# Patient Record
Sex: FEMALE | Race: OTHER | NOT HISPANIC OR LATINO | Employment: UNEMPLOYED | URBAN - METROPOLITAN AREA
[De-identification: names, ages, dates, MRNs, and addresses within clinical notes are randomized per-mention and may not be internally consistent; named-entity substitution may affect disease eponyms.]

---

## 2022-05-02 ENCOUNTER — OFFICE VISIT (OUTPATIENT)
Dept: FAMILY MEDICINE | Facility: CLINIC | Age: 27
End: 2022-05-02
Payer: COMMERCIAL

## 2022-05-02 VITALS
BODY MASS INDEX: 21.91 KG/M2 | HEIGHT: 62 IN | OXYGEN SATURATION: 98 % | DIASTOLIC BLOOD PRESSURE: 60 MMHG | SYSTOLIC BLOOD PRESSURE: 102 MMHG | HEART RATE: 84 BPM | WEIGHT: 119.06 LBS

## 2022-05-02 DIAGNOSIS — N64.4 BREAST PAIN: ICD-10-CM

## 2022-05-02 DIAGNOSIS — N60.11 FIBROCYSTIC DISEASE OF RIGHT BREAST: ICD-10-CM

## 2022-05-02 DIAGNOSIS — Z00.00 ANNUAL PHYSICAL EXAM: Primary | ICD-10-CM

## 2022-05-02 DIAGNOSIS — Z80.3 FAMILY HISTORY OF BREAST CANCER: ICD-10-CM

## 2022-05-02 DIAGNOSIS — N76.0 ACUTE VAGINITIS: ICD-10-CM

## 2022-05-02 PROCEDURE — 1159F PR MEDICATION LIST DOCUMENTED IN MEDICAL RECORD: ICD-10-PCS | Mod: CPTII,S$GLB,, | Performed by: PHYSICIAN ASSISTANT

## 2022-05-02 PROCEDURE — 3074F SYST BP LT 130 MM HG: CPT | Mod: CPTII,S$GLB,, | Performed by: PHYSICIAN ASSISTANT

## 2022-05-02 PROCEDURE — 3074F PR MOST RECENT SYSTOLIC BLOOD PRESSURE < 130 MM HG: ICD-10-PCS | Mod: CPTII,S$GLB,, | Performed by: PHYSICIAN ASSISTANT

## 2022-05-02 PROCEDURE — 1160F RVW MEDS BY RX/DR IN RCRD: CPT | Mod: CPTII,S$GLB,, | Performed by: PHYSICIAN ASSISTANT

## 2022-05-02 PROCEDURE — 3008F PR BODY MASS INDEX (BMI) DOCUMENTED: ICD-10-PCS | Mod: CPTII,S$GLB,, | Performed by: PHYSICIAN ASSISTANT

## 2022-05-02 PROCEDURE — 87481 CANDIDA DNA AMP PROBE: CPT | Mod: 59 | Performed by: PHYSICIAN ASSISTANT

## 2022-05-02 PROCEDURE — 87591 N.GONORRHOEAE DNA AMP PROB: CPT | Mod: 59 | Performed by: PHYSICIAN ASSISTANT

## 2022-05-02 PROCEDURE — 1159F MED LIST DOCD IN RCRD: CPT | Mod: CPTII,S$GLB,, | Performed by: PHYSICIAN ASSISTANT

## 2022-05-02 PROCEDURE — 3078F DIAST BP <80 MM HG: CPT | Mod: CPTII,S$GLB,, | Performed by: PHYSICIAN ASSISTANT

## 2022-05-02 PROCEDURE — 3078F PR MOST RECENT DIASTOLIC BLOOD PRESSURE < 80 MM HG: ICD-10-PCS | Mod: CPTII,S$GLB,, | Performed by: PHYSICIAN ASSISTANT

## 2022-05-02 PROCEDURE — 1160F PR REVIEW ALL MEDS BY PRESCRIBER/CLIN PHARMACIST DOCUMENTED: ICD-10-PCS | Mod: CPTII,S$GLB,, | Performed by: PHYSICIAN ASSISTANT

## 2022-05-02 PROCEDURE — 87491 CHLMYD TRACH DNA AMP PROBE: CPT | Performed by: PHYSICIAN ASSISTANT

## 2022-05-02 PROCEDURE — 99999 PR PBB SHADOW E&M-NEW PATIENT-LVL IV: CPT | Mod: PBBFAC,,, | Performed by: PHYSICIAN ASSISTANT

## 2022-05-02 PROCEDURE — 99999 PR PBB SHADOW E&M-NEW PATIENT-LVL IV: ICD-10-PCS | Mod: PBBFAC,,, | Performed by: PHYSICIAN ASSISTANT

## 2022-05-02 PROCEDURE — 3008F BODY MASS INDEX DOCD: CPT | Mod: CPTII,S$GLB,, | Performed by: PHYSICIAN ASSISTANT

## 2022-05-02 PROCEDURE — 99385 PREV VISIT NEW AGE 18-39: CPT | Mod: S$GLB,,, | Performed by: PHYSICIAN ASSISTANT

## 2022-05-02 PROCEDURE — 99385 PR PREVENTIVE VISIT,NEW,18-39: ICD-10-PCS | Mod: S$GLB,,, | Performed by: PHYSICIAN ASSISTANT

## 2022-05-02 NOTE — PROGRESS NOTES
Subjective:       Patient ID: Tiffanie Braun is a 26 y.o. female.    Chief Complaint: Establish Care    Patient is new to [facility].  She has no chronic medical conditions.  She had normal PAP one year ago.  Normal monthly menses with some cramping and heaviness. Has new right breast pain/soreness.  MGM +breast cancer. She also had noticed new vaginal odor. No itching or pain.  Used otc cream with out relief. Patients patient medical/surgical, social and family histories have been reviewed       Review of Systems   Genitourinary: Positive for vaginal discharge. Negative for menstrual problem, pelvic pain, vaginal bleeding and vaginal pain.   Skin: Negative for rash.       Objective:      Physical Exam  Exam conducted with a chaperone present.   Constitutional:       General: She is not in acute distress.     Appearance: Normal appearance. She is not ill-appearing.   HENT:      Head: Normocephalic and atraumatic.   Eyes:      Conjunctiva/sclera: Conjunctivae normal.   Pulmonary:      Effort: Pulmonary effort is normal.   Chest:   Breasts:      Right: Mass (3 oclock position tender mobile nodule ) and tenderness present. No axillary adenopathy or supraclavicular adenopathy.      Left: Normal. No axillary adenopathy or supraclavicular adenopathy.       Genitourinary:     Exam position: Supine.      Labia:         Right: No lesion.         Left: No lesion.       Vagina: Vaginal discharge present.      Cervix: Normal.   Lymphadenopathy:      Upper Body:      Right upper body: No supraclavicular, axillary or pectoral adenopathy.      Left upper body: No supraclavicular, axillary or pectoral adenopathy.   Neurological:      Mental Status: She is alert.   Psychiatric:         Mood and Affect: Mood normal.         Assessment:       1. Annual physical exam    2. Acute vaginitis    3. Breast pain    4. Family history of breast cancer    5. Fibrocystic disease of right breast        Plan:       Tiffanie was seen  "today for establish care.    Diagnoses and all orders for this visit:    Annual physical exam  -     Cancel: Liquid-Based Pap Smear, Screening  -     TSH; Future  -     CBC Auto Differential; Future  -     Lipid Panel; Future  -     Comprehensive Metabolic Panel; Future  -     Urinalysis; Future  -     Hemoglobin A1C; Future  -     Hepatitis C Antibody; Future  -     HIV 1/2 Ag/Ab (4th Gen); Future  -     Vitamin B12; Future  -     Iron and TIBC; Future  -     Ferritin; Future    Acute vaginitis  -     VAGINOSIS SCREEN BY DNA PROBE  -     C. trachomatis/N. gonorrhoeae by AMP DNA [facility]; Vagina    Breast pain  -     Mammo Digital Diagnostic Bilat with Steven; Future  -     US Breast Right Limited; Future    Family history of breast cancer  -     Mammo Digital Diagnostic Bilat with Steven; Future  -     US Breast Right Limited; Future    Fibrocystic disease of right breast  Reduce caffeine   otc aleve or ibuprofen          Follow up for fasting lab soon , mammo & US, estab pcp in 6 mo .           Documentation entered by me for this encounter may have been done in part using speech-recognition technology. Although I have made an effort to ensure accuracy, "sound like" errors may exist and should be interpreted in context.    "

## 2022-05-04 ENCOUNTER — TELEPHONE (OUTPATIENT)
Dept: FAMILY MEDICINE | Facility: CLINIC | Age: 27
End: 2022-05-04
Payer: COMMERCIAL

## 2022-05-04 DIAGNOSIS — A74.9 CHLAMYDIA: Primary | ICD-10-CM

## 2022-05-04 LAB
C TRACH DNA SPEC QL NAA+PROBE: DETECTED
N GONORRHOEA DNA SPEC QL NAA+PROBE: NOT DETECTED

## 2022-05-04 RX ORDER — DOXYCYCLINE HYCLATE 100 MG
100 TABLET ORAL 2 TIMES DAILY
Qty: 14 TABLET | Refills: 0 | Status: SHIPPED | OUTPATIENT
Start: 2022-05-04 | End: 2022-05-11

## 2022-05-04 NOTE — TELEPHONE ENCOUNTER
----- Message from Michelle Ruiz sent at 5/4/2022  2:40 PM CDT -----  Contact: pt  Type: Return Call    Who Called: pt  Who Left Message for Pt: nurse  Does the patient know what this is regarding: yes   Best Call Back Number: [phone]    Thank you~

## 2022-05-07 LAB
BACTERIAL VAGINOSIS DNA: POSITIVE
CANDIDA GLABRATA DNA: NEGATIVE
CANDIDA KRUSEI DNA: NEGATIVE
CANDIDA RRNA VAG QL PROBE: NEGATIVE
T VAGINALIS RRNA GENITAL QL PROBE: NEGATIVE

## 2022-05-09 DIAGNOSIS — N76.0 BACTERIAL VAGINITIS: Primary | ICD-10-CM

## 2022-05-09 DIAGNOSIS — B96.89 BACTERIAL VAGINITIS: Primary | ICD-10-CM

## 2022-05-09 RX ORDER — METRONIDAZOLE 7.5 MG/G
1 GEL VAGINAL NIGHTLY
Qty: 70 G | Refills: 0 | Status: SHIPPED | OUTPATIENT
Start: 2022-05-09 | End: 2022-05-14

## 2022-05-23 ENCOUNTER — TELEPHONE (OUTPATIENT)
Dept: RADIOLOGY | Facility: HOSPITAL | Age: 27
End: 2022-05-23
Payer: COMMERCIAL

## 2022-05-23 NOTE — TELEPHONE ENCOUNTER
Called patient to notify age cut off for mammogram is 28. Left voicemail explaining that ultrasound still will be done and to call back and confirm at [phone].

## 2022-05-26 ENCOUNTER — HOSPITAL ENCOUNTER (OUTPATIENT)
Dept: RADIOLOGY | Facility: HOSPITAL | Age: 27
Discharge: HOME | End: 2022-05-26
Attending: PHYSICIAN ASSISTANT
Payer: COMMERCIAL

## 2022-05-26 DIAGNOSIS — N64.4 BREAST PAIN: ICD-10-CM

## 2022-05-26 DIAGNOSIS — Z80.3 FAMILY HISTORY OF BREAST CANCER: ICD-10-CM

## 2022-05-26 PROCEDURE — 76642 ULTRASOUND BREAST LIMITED: CPT | Mod: TC,RT

## 2022-05-26 PROCEDURE — 76642 ULTRASOUND BREAST LIMITED: CPT | Mod: 26,RT,, | Performed by: RADIOLOGY

## 2022-05-26 PROCEDURE — 76642 US BREAST RIGHT LIMITED: ICD-10-PCS | Mod: 26,RT,, | Performed by: RADIOLOGY

## 2022-05-27 DIAGNOSIS — D64.9 ANEMIA, UNSPECIFIED TYPE: Primary | ICD-10-CM

## 2022-06-10 ENCOUNTER — TELEPHONE (OUTPATIENT)
Dept: FAMILY MEDICINE | Facility: CLINIC | Age: 27
End: 2022-06-10
Payer: COMMERCIAL

## 2022-06-10 NOTE — TELEPHONE ENCOUNTER
----- Message from TYLER Olguin sent at 5/27/2022 11:43 AM CDT -----  Labs showed a slightly alk phos, low white cells and hemoglobin and hematocrit levels.   Recommend repeat lab 3-4 weeks and estab pcp

## 2022-08-02 ENCOUNTER — HOSPITAL ENCOUNTER (EMERGENCY)
Facility: HOSPITAL | Age: 27
Discharge: HOME | End: 2022-08-02
Attending: EMERGENCY MEDICINE

## 2022-08-02 VITALS
RESPIRATION RATE: 16 BRPM | HEIGHT: 63 IN | OXYGEN SATURATION: 99 % | HEART RATE: 64 BPM | TEMPERATURE: 98 F | SYSTOLIC BLOOD PRESSURE: 110 MMHG | DIASTOLIC BLOOD PRESSURE: 60 MMHG | WEIGHT: 120 LBS | BODY MASS INDEX: 21.26 KG/M2

## 2022-08-02 DIAGNOSIS — R06.02 SHORTNESS OF BREATH: Primary | ICD-10-CM

## 2022-08-02 DIAGNOSIS — Z87.2 HISTORY OF URTICARIA: ICD-10-CM

## 2022-08-02 DIAGNOSIS — R06.02 SOB (SHORTNESS OF BREATH): ICD-10-CM

## 2022-08-02 LAB
ANION GAP SERPL CALC-SCNC: 6 MMOL/L (ref 8–16)
B-HCG UR QL: NEGATIVE
BASOPHILS # BLD AUTO: 0.03 K/UL (ref 0–0.2)
BASOPHILS NFR BLD: 0.5 % (ref 0–1.9)
BNP SERPL-MCNC: 18 PG/ML (ref 0–99)
BUN SERPL-MCNC: 9 MG/DL (ref 6–20)
CALCIUM SERPL-MCNC: 8.9 MG/DL (ref 8.7–10.5)
CHLORIDE SERPL-SCNC: 104 MMOL/L (ref 95–110)
CO2 SERPL-SCNC: 25 MMOL/L (ref 23–29)
CREAT SERPL-MCNC: 0.7 MG/DL (ref 0.5–1.4)
CTP QC/QA: YES
DIFFERENTIAL METHOD: ABNORMAL
EOSINOPHIL # BLD AUTO: 0.1 K/UL (ref 0–0.5)
EOSINOPHIL NFR BLD: 1.3 % (ref 0–8)
ERYTHROCYTE [DISTWIDTH] IN BLOOD BY AUTOMATED COUNT: 13.8 % (ref 11.5–14.5)
EST. GFR  (NO RACE VARIABLE): >60 ML/MIN/1.73 M^2
GLUCOSE SERPL-MCNC: 109 MG/DL (ref 70–110)
HCT VFR BLD AUTO: 34.2 % (ref 37–48.5)
HGB BLD-MCNC: 11 G/DL (ref 12–16)
IMM GRANULOCYTES # BLD AUTO: 0.01 K/UL (ref 0–0.04)
IMM GRANULOCYTES NFR BLD AUTO: 0.2 % (ref 0–0.5)
LYMPHOCYTES # BLD AUTO: 2.3 K/UL (ref 1–4.8)
LYMPHOCYTES NFR BLD: 38 % (ref 18–48)
MCH RBC QN AUTO: 27.8 PG (ref 27–31)
MCHC RBC AUTO-ENTMCNC: 32.2 G/DL (ref 32–36)
MCV RBC AUTO: 86 FL (ref 82–98)
MONOCYTES # BLD AUTO: 0.4 K/UL (ref 0.3–1)
MONOCYTES NFR BLD: 7 % (ref 4–15)
NEUTROPHILS # BLD AUTO: 3.3 K/UL (ref 1.8–7.7)
NEUTROPHILS NFR BLD: 53 % (ref 38–73)
NRBC BLD-RTO: 0 /100 WBC
PLATELET # BLD AUTO: 258 K/UL (ref 150–450)
PMV BLD AUTO: 10.7 FL (ref 9.2–12.9)
POTASSIUM SERPL-SCNC: 3.8 MMOL/L (ref 3.5–5.1)
RBC # BLD AUTO: 3.96 M/UL (ref 4–5.4)
SODIUM SERPL-SCNC: 135 MMOL/L (ref 136–145)
TROPONIN I SERPL DL<=0.01 NG/ML-MCNC: <0.03 NG/ML
WBC # BLD AUTO: 6.16 K/UL (ref 3.9–12.7)

## 2022-08-02 PROCEDURE — 81025 URINE PREGNANCY TEST: CPT | Performed by: EMERGENCY MEDICINE

## 2022-08-02 PROCEDURE — 85025 COMPLETE CBC W/AUTO DIFF WBC: CPT | Performed by: STUDENT IN AN ORGANIZED HEALTH CARE EDUCATION/TRAINING PROGRAM

## 2022-08-02 PROCEDURE — 83880 ASSAY OF NATRIURETIC PEPTIDE: CPT | Performed by: STUDENT IN AN ORGANIZED HEALTH CARE EDUCATION/TRAINING PROGRAM

## 2022-08-02 PROCEDURE — 80048 BASIC METABOLIC PNL TOTAL CA: CPT | Performed by: STUDENT IN AN ORGANIZED HEALTH CARE EDUCATION/TRAINING PROGRAM

## 2022-08-02 PROCEDURE — 99284 EMERGENCY DEPT VISIT MOD MDM: CPT | Mod: 25

## 2022-08-02 PROCEDURE — 84484 ASSAY OF TROPONIN QUANT: CPT | Performed by: STUDENT IN AN ORGANIZED HEALTH CARE EDUCATION/TRAINING PROGRAM

## 2022-08-02 PROCEDURE — 93010 ELECTROCARDIOGRAM REPORT: CPT | Mod: ,,, | Performed by: INTERNAL MEDICINE

## 2022-08-02 PROCEDURE — 93010 EKG 12-LEAD: ICD-10-PCS | Mod: ,,, | Performed by: INTERNAL MEDICINE

## 2022-08-02 PROCEDURE — 93005 ELECTROCARDIOGRAM TRACING: CPT | Performed by: INTERNAL MEDICINE

## 2022-08-03 NOTE — ED PROVIDER NOTES
Encounter Date: 8/2/2022       History     Chief Complaint   Patient presents with    Shortness of Breath     X1 week,     Urticaria     Intermittent x1 month     HPI Kade Moreno 26 y.o. female with no significant past medical history who presented emergency department complaining of 1 week of intermittent shortness of breath.  Patient describes her shortness of breath and feeling as if she is not getting enough air.  States that she sometimes has chest discomfort with this sensation.  Additionally, she notes intermittent hives for the last month.  Denies association with food or any other contacts.  Patient has been using Benadryl intermittently to help with urticaria.  Denies fever, chills, current chest pain, current shortness of breath, abdominal pain, nausea, vomiting.  Patient denies hemoptysis, recent surgery travel, history of DVT or PE.  Denies family cardiac history.  Denies illicit drug use.  Nonsmoker.    Denies daily medications, previous surgeries, or illicit drug use.    Review of patient's allergies indicates:  No Known Allergies  No past medical history on file.  No past surgical history on file.  No family history on file.     Review of Systems   Constitutional: Negative for chills and fever.   HENT: Negative for congestion and rhinorrhea.    Eyes: Negative for discharge and redness.   Respiratory: Positive for shortness of breath. Negative for wheezing.    Cardiovascular: Negative for palpitations and leg swelling.   Gastrointestinal: Negative for abdominal pain, nausea and vomiting.   Genitourinary: Negative for dysuria and urgency.   Musculoskeletal: Negative for arthralgias and myalgias.   Skin: Negative for rash and wound.   Neurological: Negative for dizziness and light-headedness.       Physical Exam     Initial Vitals [08/02/22 2028]   BP Pulse Resp Temp SpO2   135/66 71 18 97.4 °F (36.3 °C) 100 %      MAP       --         Physical Exam    Nursing note and vitals  reviewed.  Constitutional: She appears well-developed and well-nourished.   Eyes: Conjunctivae are normal. Pupils are equal, round, and reactive to light.   Cardiovascular: Normal rate, regular rhythm, normal heart sounds and intact distal pulses.   Pulmonary/Chest: Breath sounds normal.   Lungs clear to auscultation bilaterally.   Abdominal: Abdomen is soft. Bowel sounds are normal. She exhibits no distension. There is no abdominal tenderness. There is no rebound.   Musculoskeletal:         General: No tenderness or edema. Normal range of motion.     Skin: Skin is warm and dry.         ED Course   Procedures  Labs Reviewed   BASIC METABOLIC PANEL - Abnormal; Notable for the following components:       Result Value    Sodium 135 (*)     Anion Gap 6 (*)     All other components within normal limits   CBC W/ AUTO DIFFERENTIAL - Abnormal; Notable for the following components:    RBC 3.96 (*)     Hemoglobin 11.0 (*)     Hematocrit 34.2 (*)     All other components within normal limits   TROPONIN I   B-TYPE NATRIURETIC PEPTIDE   POCT URINE PREGNANCY          Imaging Results          X-Ray Chest PA And Lateral (In process)               X-Rays:   Independently Interpreted Readings:   Chest X-Ray: Normal heart size.  No infiltrates.  No acute abnormalities. Airway midline and patent. Per my independent interpretation.     Medications - No data to display  Medical Decision Making:   Clinical Tests:   Lab Tests: Ordered and Reviewed  The following lab test(s) were unremarkable: CBC, CMP and Troponin  Radiological Study: Ordered and Reviewed  Medical Tests: Ordered and Reviewed  ED Management:  Patient is a 26-year-old female with no significant past medical history who presented emergency department complaining of intermittent shortness of breath x 1 week.  Intermittent urticaria x1 month.  On physical exam patient is clinically well-appearing.  Vital signs stable.  No rash or urticaria present.    Initial differential  diagnosis includes but not limited to pneumonia, anemia, electrolyte abnormality, arrhythmia.  Low suspicion for pulmonary embolism.  Patient is PERC negative.    Lab work and chest x-ray obtained.  CBC notable for mild anemia with hemoglobin of 11.0 discussed with patient follow-up with primary care provider regarding anemia.  BMP grossly unremarkable.  Troponin negative.  BNP within normal limits.  EKG normal sinus rhythm with rate of 63 beats per minute.  Normal intervals.  Normal axis.  There are isolated T-wave inversions in V1 and V2.  No patterns of ischemia.  Patient without chest pain.    Discussed with patient follow-up with both primary care and allergy.  Referral to Allergy placed.  Strict ED return precautions provided.  Patient verbalized understanding and in agreement with plan of care.     Grant Malone MD PGY3  [facility]                          Clinical Impression:   Final diagnoses:  [R06.02] Shortness of breath  [R06.02] SOB (shortness of breath)                 Grant Malone MD  Resident  08/03/22 9252

## 2023-02-20 LAB
CHLAMYDIA, NUC. ACID AMP: NEGATIVE
GONOCOCCUS, NUC. ACID AMP: NEGATIVE
PAP RECOMMENDATION EXT: NORMAL
TRICH VAG BY NAA: NEGATIVE

## 2023-07-13 ENCOUNTER — HOSPITAL ENCOUNTER (OUTPATIENT)
Facility: HOSPITAL | Age: 28
Discharge: HOME | End: 2023-07-13
Attending: EMERGENCY MEDICINE | Admitting: HOSPITALIST

## 2023-07-13 VITALS
DIASTOLIC BLOOD PRESSURE: 65 MMHG | WEIGHT: 110 LBS | RESPIRATION RATE: 21 BRPM | SYSTOLIC BLOOD PRESSURE: 115 MMHG | TEMPERATURE: 99 F | HEIGHT: 62 IN | HEART RATE: 88 BPM | OXYGEN SATURATION: 100 % | BODY MASS INDEX: 20.24 KG/M2

## 2023-07-13 DIAGNOSIS — M43.6 NECK STIFFNESS: ICD-10-CM

## 2023-07-13 DIAGNOSIS — R51.9 ACUTE NONINTRACTABLE HEADACHE, UNSPECIFIED HEADACHE TYPE: ICD-10-CM

## 2023-07-13 DIAGNOSIS — R50.9 FEVER, UNSPECIFIED FEVER CAUSE: Primary | ICD-10-CM

## 2023-07-13 LAB
ADENOVIRUS: NOT DETECTED
ALBUMIN SERPL BCP-MCNC: 3.8 G/DL (ref 3.5–5.2)
ALP SERPL-CCNC: 40 U/L (ref 55–135)
ALT SERPL W/O P-5'-P-CCNC: 12 U/L (ref 10–44)
ANION GAP SERPL CALC-SCNC: 11 MMOL/L (ref 8–16)
APTT PPP: 23.4 SEC (ref 21–32)
AST SERPL-CCNC: 15 U/L (ref 10–40)
B-HCG UR QL: NEGATIVE
BACTERIA #/AREA URNS HPF: ABNORMAL /HPF
BASOPHILS # BLD AUTO: 0.03 K/UL (ref 0–0.2)
BASOPHILS NFR BLD: 0.3 % (ref 0–1.9)
BILIRUB SERPL-MCNC: 1 MG/DL (ref 0.1–1)
BILIRUB UR QL STRIP: NEGATIVE
BORDETELLA PARAPERTUSSIS (IS1001): NOT DETECTED
BORDETELLA PERTUSSIS (PTXP): NOT DETECTED
BUN SERPL-MCNC: 8 MG/DL (ref 6–20)
CALCIUM SERPL-MCNC: 8.7 MG/DL (ref 8.7–10.5)
CHLAMYDIA PNEUMONIAE: NOT DETECTED
CHLORIDE SERPL-SCNC: 99 MMOL/L (ref 95–110)
CK SERPL-CCNC: 48 U/L (ref 20–180)
CLARITY CSF: CLEAR
CLARITY UR: CLEAR
CO2 SERPL-SCNC: 23 MMOL/L (ref 23–29)
COLOR CSF: COLORLESS
COLOR UR: YELLOW
CORONAVIRUS 229E, COMMON COLD VIRUS: NOT DETECTED
CORONAVIRUS HKU1, COMMON COLD VIRUS: NOT DETECTED
CORONAVIRUS NL63, COMMON COLD VIRUS: NOT DETECTED
CORONAVIRUS OC43, COMMON COLD VIRUS: NOT DETECTED
CREAT SERPL-MCNC: 0.7 MG/DL (ref 0.5–1.4)
CSF TUBE NUMBER: 1
CSF TUBE NUMBER: 1
CTP QC/QA: YES
DIFFERENTIAL METHOD: ABNORMAL
EOSINOPHIL # BLD AUTO: 0 K/UL (ref 0–0.5)
EOSINOPHIL NFR BLD: 0.2 % (ref 0–8)
ERYTHROCYTE [DISTWIDTH] IN BLOOD BY AUTOMATED COUNT: 12.9 % (ref 11.5–14.5)
EST. GFR  (NO RACE VARIABLE): >60 ML/MIN/1.73 M^2
FLUBV RNA NPH QL NAA+NON-PROBE: NOT DETECTED
GLUCOSE CSF-MCNC: 60 MG/DL (ref 40–70)
GLUCOSE SERPL-MCNC: 93 MG/DL (ref 70–110)
GLUCOSE UR QL STRIP: NEGATIVE
GROUP A STREP, MOLECULAR: NEGATIVE
HCT VFR BLD AUTO: 33.7 % (ref 37–48.5)
HETEROPH AB SERPL QL IA: NEGATIVE
HGB BLD-MCNC: 11 G/DL (ref 12–16)
HGB UR QL STRIP: ABNORMAL
HIV1+2 IGG SERPL QL IA.RAPID: NEGATIVE
HPIV1 RNA NPH QL NAA+NON-PROBE: NOT DETECTED
HPIV2 RNA NPH QL NAA+NON-PROBE: NOT DETECTED
HPIV3 RNA NPH QL NAA+NON-PROBE: NOT DETECTED
HPIV4 RNA NPH QL NAA+NON-PROBE: NOT DETECTED
HUMAN METAPNEUMOVIRUS: NOT DETECTED
HYALINE CASTS #/AREA URNS LPF: 11 /LPF
IMM GRANULOCYTES # BLD AUTO: 0.03 K/UL (ref 0–0.04)
IMM GRANULOCYTES NFR BLD AUTO: 0.3 % (ref 0–0.5)
INFLUENZA A (SUBTYPES H1,H1-2009,H3): NOT DETECTED
INR PPP: 1.1 (ref 0.8–1.2)
KETONES UR QL STRIP: ABNORMAL
LACTATE SERPL-SCNC: 0.8 MMOL/L (ref 0.5–1.9)
LEUKOCYTE ESTERASE UR QL STRIP: NEGATIVE
LYMPHOCYTES # BLD AUTO: 1.2 K/UL (ref 1–4.8)
LYMPHOCYTES NFR BLD: 13.1 % (ref 18–48)
LYMPHOCYTES NFR CSF MANUAL: 100 % (ref 40–80)
MAGNESIUM SERPL-MCNC: 1.8 MG/DL (ref 1.6–2.6)
MCH RBC QN AUTO: 28.4 PG (ref 27–31)
MCHC RBC AUTO-ENTMCNC: 32.6 G/DL (ref 32–36)
MCV RBC AUTO: 87 FL (ref 82–98)
MICROSCOPIC COMMENT: ABNORMAL
MISCELLANEOUS TEST NAME: NORMAL
MONOCYTES # BLD AUTO: 1 K/UL (ref 0.3–1)
MONOCYTES NFR BLD: 10.9 % (ref 4–15)
MYCOPLASMA PNEUMONIAE: NOT DETECTED
NEUTROPHILS # BLD AUTO: 6.7 K/UL (ref 1.8–7.7)
NEUTROPHILS NFR BLD: 75.2 % (ref 38–73)
NITRITE UR QL STRIP: NEGATIVE
NRBC BLD-RTO: 0 /100 WBC
PH UR STRIP: 6 [PH] (ref 5–8)
PLATELET # BLD AUTO: 211 K/UL (ref 150–450)
PMV BLD AUTO: 10 FL (ref 9.2–12.9)
POTASSIUM SERPL-SCNC: 3.4 MMOL/L (ref 3.5–5.1)
PROT CSF-MCNC: 25 MG/DL (ref 15–40)
PROT SERPL-MCNC: 7.2 G/DL (ref 6–8.4)
PROT UR QL STRIP: ABNORMAL
PROTHROMBIN TIME: 12.6 SEC (ref 9–12.5)
RBC # BLD AUTO: 3.88 M/UL (ref 4–5.4)
RBC # CSF: 2 /CU MM
RBC #/AREA URNS HPF: 6 /HPF (ref 0–4)
RESPIRATORY INFECTION PANEL SOURCE: NORMAL
RSV RNA NPH QL NAA+NON-PROBE: NOT DETECTED
RV+EV RNA NPH QL NAA+NON-PROBE: NOT DETECTED
SARS-COV-2 RNA RESP QL NAA+PROBE: NOT DETECTED
SODIUM SERPL-SCNC: 133 MMOL/L (ref 136–145)
SP GR UR STRIP: 1.03 (ref 1–1.03)
SPECIMEN VOL CSF: 6 ML
SQUAMOUS #/AREA URNS HPF: 5 /HPF
URN SPEC COLLECT METH UR: ABNORMAL
UROBILINOGEN UR STRIP-ACNC: NEGATIVE EU/DL
WBC # BLD AUTO: 8.88 K/UL (ref 3.9–12.7)
WBC # CSF: 1 /CU MM (ref 0–5)
WBC #/AREA URNS HPF: 3 /HPF (ref 0–5)

## 2023-07-13 PROCEDURE — 96365 THER/PROPH/DIAG IV INF INIT: CPT

## 2023-07-13 PROCEDURE — 63600175 PHARM REV CODE 636 W HCPCS: Performed by: EMERGENCY MEDICINE

## 2023-07-13 PROCEDURE — 82945 GLUCOSE OTHER FLUID: CPT | Performed by: STUDENT IN AN ORGANIZED HEALTH CARE EDUCATION/TRAINING PROGRAM

## 2023-07-13 PROCEDURE — 86787 VARICELLA-ZOSTER ANTIBODY: CPT

## 2023-07-13 PROCEDURE — 83735 ASSAY OF MAGNESIUM: CPT | Performed by: STUDENT IN AN ORGANIZED HEALTH CARE EDUCATION/TRAINING PROGRAM

## 2023-07-13 PROCEDURE — G0378 HOSPITAL OBSERVATION PER HR: HCPCS

## 2023-07-13 PROCEDURE — 89051 BODY FLUID CELL COUNT: CPT | Performed by: STUDENT IN AN ORGANIZED HEALTH CARE EDUCATION/TRAINING PROGRAM

## 2023-07-13 PROCEDURE — 86703 HIV-1/HIV-2 1 RESULT ANTBDY: CPT | Performed by: STUDENT IN AN ORGANIZED HEALTH CARE EDUCATION/TRAINING PROGRAM

## 2023-07-13 PROCEDURE — 25000003 PHARM REV CODE 250: Performed by: STUDENT IN AN ORGANIZED HEALTH CARE EDUCATION/TRAINING PROGRAM

## 2023-07-13 PROCEDURE — 63600175 PHARM REV CODE 636 W HCPCS: Performed by: STUDENT IN AN ORGANIZED HEALTH CARE EDUCATION/TRAINING PROGRAM

## 2023-07-13 PROCEDURE — 25000003 PHARM REV CODE 250: Performed by: EMERGENCY MEDICINE

## 2023-07-13 PROCEDURE — 87040 BLOOD CULTURE FOR BACTERIA: CPT | Mod: 59 | Performed by: STUDENT IN AN ORGANIZED HEALTH CARE EDUCATION/TRAINING PROGRAM

## 2023-07-13 PROCEDURE — 87070 CULTURE OTHR SPECIMN AEROBIC: CPT | Performed by: STUDENT IN AN ORGANIZED HEALTH CARE EDUCATION/TRAINING PROGRAM

## 2023-07-13 PROCEDURE — 86308 HETEROPHILE ANTIBODY SCREEN: CPT | Performed by: STUDENT IN AN ORGANIZED HEALTH CARE EDUCATION/TRAINING PROGRAM

## 2023-07-13 PROCEDURE — 96375 TX/PRO/DX INJ NEW DRUG ADDON: CPT

## 2023-07-13 PROCEDURE — 81001 URINALYSIS AUTO W/SCOPE: CPT | Performed by: STUDENT IN AN ORGANIZED HEALTH CARE EDUCATION/TRAINING PROGRAM

## 2023-07-13 PROCEDURE — 62328 DX LMBR SPI PNXR W/FLUOR/CT: CPT

## 2023-07-13 PROCEDURE — 86788 WEST NILE VIRUS AB IGM: CPT | Performed by: STUDENT IN AN ORGANIZED HEALTH CARE EDUCATION/TRAINING PROGRAM

## 2023-07-13 PROCEDURE — 84157 ASSAY OF PROTEIN OTHER: CPT | Performed by: STUDENT IN AN ORGANIZED HEALTH CARE EDUCATION/TRAINING PROGRAM

## 2023-07-13 PROCEDURE — 87205 SMEAR GRAM STAIN: CPT | Performed by: STUDENT IN AN ORGANIZED HEALTH CARE EDUCATION/TRAINING PROGRAM

## 2023-07-13 PROCEDURE — 81025 URINE PREGNANCY TEST: CPT | Performed by: STUDENT IN AN ORGANIZED HEALTH CARE EDUCATION/TRAINING PROGRAM

## 2023-07-13 PROCEDURE — 80053 COMPREHEN METABOLIC PANEL: CPT | Performed by: STUDENT IN AN ORGANIZED HEALTH CARE EDUCATION/TRAINING PROGRAM

## 2023-07-13 PROCEDURE — 85730 THROMBOPLASTIN TIME PARTIAL: CPT | Performed by: STUDENT IN AN ORGANIZED HEALTH CARE EDUCATION/TRAINING PROGRAM

## 2023-07-13 PROCEDURE — 96366 THER/PROPH/DIAG IV INF ADDON: CPT

## 2023-07-13 PROCEDURE — 86789 WEST NILE VIRUS ANTIBODY: CPT | Performed by: STUDENT IN AN ORGANIZED HEALTH CARE EDUCATION/TRAINING PROGRAM

## 2023-07-13 PROCEDURE — 86653 ENCEPHALTIS ST LOUIS ANTBODY: CPT

## 2023-07-13 PROCEDURE — 86665 EPSTEIN-BARR CAPSID VCA: CPT | Mod: 59 | Performed by: STUDENT IN AN ORGANIZED HEALTH CARE EDUCATION/TRAINING PROGRAM

## 2023-07-13 PROCEDURE — 85610 PROTHROMBIN TIME: CPT | Performed by: STUDENT IN AN ORGANIZED HEALTH CARE EDUCATION/TRAINING PROGRAM

## 2023-07-13 PROCEDURE — 85025 COMPLETE CBC W/AUTO DIFF WBC: CPT | Performed by: STUDENT IN AN ORGANIZED HEALTH CARE EDUCATION/TRAINING PROGRAM

## 2023-07-13 PROCEDURE — 87651 STREP A DNA AMP PROBE: CPT | Performed by: EMERGENCY MEDICINE

## 2023-07-13 PROCEDURE — 36415 COLL VENOUS BLD VENIPUNCTURE: CPT | Performed by: STUDENT IN AN ORGANIZED HEALTH CARE EDUCATION/TRAINING PROGRAM

## 2023-07-13 PROCEDURE — 30000890 HC MISC. SEND OUT TEST

## 2023-07-13 PROCEDURE — 87483 CNS DNA AMP PROBE TYPE 12-25: CPT

## 2023-07-13 PROCEDURE — 62270 DX LMBR SPI PNXR: CPT

## 2023-07-13 PROCEDURE — 96361 HYDRATE IV INFUSION ADD-ON: CPT

## 2023-07-13 PROCEDURE — 82550 ASSAY OF CK (CPK): CPT | Performed by: STUDENT IN AN ORGANIZED HEALTH CARE EDUCATION/TRAINING PROGRAM

## 2023-07-13 PROCEDURE — 99285 EMERGENCY DEPT VISIT HI MDM: CPT | Mod: 25

## 2023-07-13 PROCEDURE — 83605 ASSAY OF LACTIC ACID: CPT | Performed by: STUDENT IN AN ORGANIZED HEALTH CARE EDUCATION/TRAINING PROGRAM

## 2023-07-13 PROCEDURE — 87529 HSV DNA AMP PROBE: CPT | Performed by: STUDENT IN AN ORGANIZED HEALTH CARE EDUCATION/TRAINING PROGRAM

## 2023-07-13 PROCEDURE — 87798 DETECT AGENT NOS DNA AMP: CPT | Performed by: STUDENT IN AN ORGANIZED HEALTH CARE EDUCATION/TRAINING PROGRAM

## 2023-07-13 RX ORDER — SODIUM CHLORIDE 9 MG/ML
INJECTION, SOLUTION INTRAVENOUS CONTINUOUS
Status: CANCELLED | OUTPATIENT
Start: 2023-07-13 | End: 2023-07-14

## 2023-07-13 RX ORDER — FENTANYL CITRATE 50 UG/ML
50 INJECTION, SOLUTION INTRAMUSCULAR; INTRAVENOUS ONCE AS NEEDED
Status: COMPLETED | OUTPATIENT
Start: 2023-07-13 | End: 2023-07-13

## 2023-07-13 RX ORDER — PROCHLORPERAZINE EDISYLATE 5 MG/ML
5 INJECTION INTRAMUSCULAR; INTRAVENOUS EVERY 6 HOURS PRN
Status: CANCELLED | OUTPATIENT
Start: 2023-07-13

## 2023-07-13 RX ORDER — NALOXONE HCL 0.4 MG/ML
0.02 VIAL (ML) INJECTION
Status: CANCELLED | OUTPATIENT
Start: 2023-07-13

## 2023-07-13 RX ORDER — ACETAMINOPHEN 500 MG
1000 TABLET ORAL EVERY 6 HOURS PRN
Status: CANCELLED | OUTPATIENT
Start: 2023-07-13

## 2023-07-13 RX ORDER — HYDROCODONE BITARTRATE AND ACETAMINOPHEN 7.5; 325 MG/1; MG/1
1 TABLET ORAL
COMMUNITY
Start: 2023-07-11 | End: 2024-02-08

## 2023-07-13 RX ORDER — IBUPROFEN 400 MG/1
800 TABLET ORAL EVERY 6 HOURS PRN
Status: CANCELLED | OUTPATIENT
Start: 2023-07-13

## 2023-07-13 RX ORDER — DIPHENHYDRAMINE HYDROCHLORIDE 50 MG/ML
25 INJECTION INTRAMUSCULAR; INTRAVENOUS
Status: COMPLETED | OUTPATIENT
Start: 2023-07-13 | End: 2023-07-13

## 2023-07-13 RX ORDER — ONDANSETRON 2 MG/ML
8 INJECTION INTRAMUSCULAR; INTRAVENOUS EVERY 6 HOURS PRN
Status: CANCELLED | OUTPATIENT
Start: 2023-07-13

## 2023-07-13 RX ORDER — DIPHENHYDRAMINE HYDROCHLORIDE 50 MG/ML
12.5 INJECTION INTRAMUSCULAR; INTRAVENOUS
Status: DISCONTINUED | OUTPATIENT
Start: 2023-07-13 | End: 2023-07-13

## 2023-07-13 RX ORDER — OXYCODONE HYDROCHLORIDE 5 MG/1
5 TABLET ORAL EVERY 6 HOURS PRN
Status: CANCELLED | OUTPATIENT
Start: 2023-07-13

## 2023-07-13 RX ORDER — DIAZEPAM 10 MG/2ML
2 INJECTION INTRAMUSCULAR
Status: DISCONTINUED | OUTPATIENT
Start: 2023-07-13 | End: 2023-07-13

## 2023-07-13 RX ORDER — PROCHLORPERAZINE EDISYLATE 5 MG/ML
5 INJECTION INTRAMUSCULAR; INTRAVENOUS
Status: COMPLETED | OUTPATIENT
Start: 2023-07-13 | End: 2023-07-13

## 2023-07-13 RX ORDER — DIAZEPAM 10 MG/2ML
2 INJECTION INTRAMUSCULAR ONCE AS NEEDED
Status: COMPLETED | OUTPATIENT
Start: 2023-07-13 | End: 2023-07-13

## 2023-07-13 RX ORDER — KETOROLAC TROMETHAMINE 30 MG/ML
15 INJECTION, SOLUTION INTRAMUSCULAR; INTRAVENOUS
Status: DISCONTINUED | OUTPATIENT
Start: 2023-07-13 | End: 2023-07-13

## 2023-07-13 RX ORDER — VANCOMYCIN HCL IN 5 % DEXTROSE 1G/250ML
1000 PLASTIC BAG, INJECTION (ML) INTRAVENOUS ONCE
Status: DISCONTINUED | OUTPATIENT
Start: 2023-07-13 | End: 2023-07-13

## 2023-07-13 RX ORDER — LIDOCAINE HYDROCHLORIDE 10 MG/ML
10 INJECTION, SOLUTION EPIDURAL; INFILTRATION; INTRACAUDAL; PERINEURAL
Status: COMPLETED | OUTPATIENT
Start: 2023-07-13 | End: 2023-07-13

## 2023-07-13 RX ORDER — KETOROLAC TROMETHAMINE 30 MG/ML
15 INJECTION, SOLUTION INTRAMUSCULAR; INTRAVENOUS
Status: COMPLETED | OUTPATIENT
Start: 2023-07-13 | End: 2023-07-13

## 2023-07-13 RX ORDER — DOXYCYCLINE 100 MG/1
100 CAPSULE ORAL 2 TIMES DAILY
Qty: 14 CAPSULE | Refills: 0 | Status: ON HOLD | OUTPATIENT
Start: 2023-07-13 | End: 2023-07-19 | Stop reason: SDUPTHER

## 2023-07-13 RX ADMIN — SODIUM CHLORIDE 1000 ML: 0.9 INJECTION, SOLUTION INTRAVENOUS at 05:07

## 2023-07-13 RX ADMIN — DIAZEPAM 2 MG: 5 INJECTION, SOLUTION INTRAMUSCULAR; INTRAVENOUS at 05:07

## 2023-07-13 RX ADMIN — PROCHLORPERAZINE EDISYLATE 5 MG: 5 INJECTION INTRAMUSCULAR; INTRAVENOUS at 05:07

## 2023-07-13 RX ADMIN — FENTANYL CITRATE 50 MCG: 50 INJECTION, SOLUTION INTRAMUSCULAR; INTRAVENOUS at 05:07

## 2023-07-13 RX ADMIN — DIPHENHYDRAMINE HYDROCHLORIDE 25 MG: 50 INJECTION INTRAMUSCULAR; INTRAVENOUS at 08:07

## 2023-07-13 RX ADMIN — CEFTRIAXONE SODIUM 2 G: 2 INJECTION, POWDER, FOR SOLUTION INTRAMUSCULAR; INTRAVENOUS at 07:07

## 2023-07-13 RX ADMIN — KETOROLAC TROMETHAMINE 15 MG: 30 INJECTION, SOLUTION INTRAMUSCULAR; INTRAVENOUS at 08:07

## 2023-07-13 RX ADMIN — SODIUM CHLORIDE 1000 ML: 0.9 INJECTION, SOLUTION INTRAVENOUS at 07:07

## 2023-07-13 RX ADMIN — LIDOCAINE HYDROCHLORIDE 100 MG: 10 INJECTION, SOLUTION EPIDURAL; INFILTRATION; INTRACAUDAL; PERINEURAL at 06:07

## 2023-07-13 NOTE — ED PROVIDER NOTES
Patient was evaluated by Dr. Dill on previous shift.  She had presumed meningitis with lumbar puncture results pending.  I examined patient.  Brief history.  Three day history of fever, headache and stiff neck.  Patient does have body aches.  No history of IV drug use.  Patient did have her wisdom teeth removed yesterday but symptoms started 2 days prior.  Physical exam.  Patient is alert.  Posterior pharyngeal area with no exudate.  Recent 3rd molar extraction with no drainage.  There is some posterior neck tenderness.  There is no lymphadenopathy.  Lumbar puncture site with Band-Aid.  There is some mild bilateral lower paracervical tenderness.  No CVA tenderness.  Absolutely no abdominal tenderness.  No rashes.  Neurologically intact.  Given negative lumbar puncture discussed admission versus outpatient treatment with the patient.  Patient states she currently feels great and is excited about possible discharge.  Given continued unclear etiology of fever, I checked Monospot, group a strep and chest x-ray.  All are unremarkable.  Viral respiratory panel previously see ordered is negative.  Urinalysis is normal.  Likely viral source.  Can not rule out occult infection.  Blood cultures are pending.  Rocephin given here.  Will continue doxycycline at home until cultures return.  Strict return precautions given.     Bassem Chapman MD  07/13/23 1002      I spoke to patient's .  Patient is now having fever up to 106.1° F.  She is having more neck pain.  Patient told day must come back to emergency department for further evaluation.  Differential diagnosis would include diskitis, epidural abscess, other infectious etiology, rheumatologic disorder.   voiced understanding.     Bassem Chapman MD  07/15/23 1807    I spoke with patient and  again today.  Patient states she is feeling somewhat better today.  I again urged patient to come back to the emergency room for further evaluation.  I am  concerned about deep space infection in the neck versus diskitis.  Patient aware this is possible life-threatening problem including airway obstruction, severe sepsis, death.     Bassem Chapman MD  07/16/23 7917

## 2023-07-13 NOTE — ED PROVIDER NOTES
Encounter Date: 7/13/2023       History     Chief Complaint   Patient presents with    Fever     X3 days, tylenol 1hr ago    Headache    stiff neck     HPI  27-year-old no medical history who presents complaining of fevers.  Started 4 days ago, intermittent, worse at night, characterized as up to 103, associated with headache, nausea, stiff neck.  Headache is throbbing, worse with exposure to light.      Denies cough, denies dysuria or hematuria.  Had a wisdom tooth pulled yesterday but symptoms preceded this procedure.  Up-to-date on all of her childhood vaccinations.  Recently traveled to [place] and reported being bitten by quite a few mosquitos while there.  Has been taking regularly scheduled Tylenol at home.  Does not take any other prescribed medications.    Review of patient's allergies indicates:  No Known Allergies  History reviewed. No pertinent past medical history.  History reviewed. No pertinent surgical history.  Family History   Problem Relation Age of Onset    Cancer Maternal Grandmother         breast     Social History     Tobacco Use    Smoking status: Never    Smokeless tobacco: Never   Substance Use Topics    Alcohol use: Yes     Alcohol/week: 5.0 standard drinks     Types: 4 Glasses of wine, 1 Shots of liquor per week    Drug use: Never     Review of Systems   Constitutional:  Positive for fever.   HENT:  Negative for sore throat.    Eyes:  Negative for redness.   Respiratory:  Negative for shortness of breath.    Cardiovascular:  Negative for chest pain.   Gastrointestinal:  Positive for nausea.   Endocrine: Negative for heat intolerance.   Genitourinary:  Negative for dysuria.   Musculoskeletal:  Positive for back pain.   Skin:  Negative for rash.   Allergic/Immunologic: Negative for environmental allergies.   Neurological:  Negative for weakness.   Hematological:  Does not bruise/bleed easily.     Physical Exam     Initial Vitals [07/13/23 0350]   BP Pulse Resp Temp SpO2   136/84 (!)  117 18 (!) 100.5 °F (38.1 °C) 95 %      MAP       --         Physical Exam    Constitutional: She appears well-developed and well-nourished.   HENT:   Head: Normocephalic and atraumatic. Hair is normal.   Eyes: Conjunctivae and EOM are normal.   Neck: Neck supple. No stridor present.   Decreased neck flexion   Cardiovascular:  Normal rate.           No murmur heard.  Pulmonary/Chest: Breath sounds normal. No respiratory distress.   Abdominal: Abdomen is soft. She exhibits no distension.   Musculoskeletal:         General: No tenderness. Normal range of motion.      Cervical back: Neck supple.     Neurological: She is alert and oriented to person, place, and time.   5/5 strength upper and lower extremities bilaterally, sensation intact upper and lower extremities bilaterally, alert speaking clearly without dysarthria   Skin: Skin is warm and dry.   Psychiatric: She has a normal mood and affect. Her speech is normal.       ED Course   Lumbar Puncture    Date/Time: 7/13/2023 6:26 AM  Location procedure was performed: [facility] EMERGENCY DEPARTMENT  Performed by: Ilan Sharpe MD  Authorized by: Jesusita Dill MD   Consent Done: Yes  Indications: evaluation for infection  Anesthesia: local infiltration    Anesthesia:  Local Anesthetic: lidocaine 1% without epinephrine  Anesthetic total: 5 mL    Patient sedated: no  Preparation: Patient was prepped and draped in the usual sterile fashion.  Lumbar space: L4-L5 interspace  Patient's position: left lateral decubitus  Needle gauge: 20  Needle type: spinal needle - Quincke tip  Needle length: 3.5 in  Number of attempts: 2  Opening pressure: 16 cm H2O  Fluid appearance: clear  Tubes of fluid: 4  Total volume: 7 ml  Post-procedure: site cleaned and adhesive bandage applied  Patient tolerance: Patient tolerated the procedure well with no immediate complications  Comments: In addition to local anesthetic patient was also given fentanyl for analgesia.      Pgy5 MDM:   27-year-old no  medical history who presents complaining of 3 days of fever, neck stiffness, headache, nausea.  No history of migraines.  Vital signs stable on arrival, febrile to 100.5.  On exam patient is awake and alert, has good strength in bilateral upper and lower extremities, nonfocal neurologic exam overall.  Has a couple of what appear to be insect bites but no vesicular rash.  Most concerning for viral encephalitis, viral meningitis, influenza, insect borne encephalitis.  Upon arrival to the room IV access is obtained and patient was given 30 cc/kilogram of fluids.  Labs, lactic acid, blood cultures, urinalysis, chest x-ray all obtained.  CT head without obtained.  Antibiotics were deferred to increase yield of lumbar puncture.  Patient was consented and given Compazine and Valium for headache and fentanyl for analgesia prior to procedure.   Spinal tap performed as per above, clear spinal fluid obtained, 7 ml.  After this was performed patient was treated empirically for meningitis with vancomycin, ceftriaxone, acyclovir.  CT head without which was read prior to LP being done shows no focal neurologic findings, CBC with mild anemia.  Plan to admit to Medicine for meningitis rule out pending further workup.  Dawson Sharpe MD  [facility] -[address] [place] [address] ED  [phone]  6:23 AM 7/13/2023     Labs Reviewed   CBC W/ AUTO DIFFERENTIAL - Abnormal; Notable for the following components:       Result Value    RBC 3.88 (*)     Hemoglobin 11.0 (*)     Hematocrit 33.7 (*)     Gran % 75.2 (*)     Lymph % 13.1 (*)     All other components within normal limits   COMPREHENSIVE METABOLIC PANEL - Abnormal; Notable for the following components:    Sodium 133 (*)     Potassium 3.4 (*)     Alkaline Phosphatase 40 (*)     All other components within normal limits   URINALYSIS, REFLEX TO URINE CULTURE - Abnormal; Notable for the following components:    Protein, UA 1+ (*)     Ketones, UA 3+ (*)     Occult Blood  UA 2+ (*)     All other components within normal limits    Narrative:     Specimen Source->Urine   PROTIME-INR - Abnormal; Notable for the following components:    Prothrombin Time 12.6 (*)     All other components within normal limits   URINALYSIS MICROSCOPIC - Abnormal; Notable for the following components:    RBC, UA 6 (*)     Hyaline Casts, UA 11 (*)     All other components within normal limits    Narrative:     Specimen Source->Urine   RESPIRATORY INFECTION PANEL (PCR), NASOPHARYNGEAL    Narrative:     Respiratory Infection Panel source->NP Swab   CULTURE, BLOOD   CULTURE, BLOOD   CULTURE, CSF  (INCLUDES STAIN)   RESPIRATORY VIRAL PANEL BY PCR   LACTIC ACID, PLASMA   MAGNESIUM   APTT   RAPID HIV   GLUCOSE, CSF    Narrative:     Specimen Tube Number->1   PROTEIN, CSF    Narrative:     Specimen Tube Number->1   MISCELLANEOUS SENDOUT TEST, NON-BLOOD    Narrative:     West Nile IgM, Huetter encephalitis virus IgM, varicella  zoster virus IgM  Source:->CSF (Cerebrospinal Fluid)  Name of Test->West Nile IgM, Huetter encephalitis virus  IgM, varicella zoster virus IgM  Release to patient->Immediate   MISCELLANEOUS SENDOUT TEST, NON-BLOOD    Narrative:     Meningitis/encephalitis panel  Source:->CSF (Cerebrospinal Fluid)  Name of Test->Meningitis/encephalitis panel  Release to patient->Immediate   INFLUENZA A AND B ANTIGEN   CK   RIK-BARR VIRUS ANTIBODY PANEL   CSF CELL COUNT WITH DIFFERENTIAL   HERPES SIMPLEX (HSV) BY RAPID PCR, NON-BLOOD   POCT URINE PREGNANCY          Imaging Results              CT Head Without Contrast (Final result)  Result time 07/13/23 04:53:19      Final result by Rosa Smith MD (07/13/23 04:53:19)                   Narrative:    EXAM:  CT Head Without Intravenous Contrast    CLINICAL HISTORY:  The patient is 27 years old and is Female; headache, stiff neck, fever, light sensitivity    TECHNIQUE:  Axial computed tomography images of the head/brain without intravenous  contrast.  Sagittal and coronal reformatted images were created and reviewed.  This CT exam was performed using one or more of the following dose reduction techniques:  automated exposure control, adjustment of the mA and/or kV according to patient size, and/or use of iterative reconstruction technique.    COMPARISON:  No relevant prior studies available.    FINDINGS:  Brain:  Unremarkable.  No hemorrhage.  No significant white matter disease.  No edema.  Ventricles:  Unremarkable.  No ventriculomegaly.  Bones/joints:  Unremarkable.  No acute skull fracture.  Soft tissues:  Unremarkable.  Sinuses:  Unremarkable as visualized.  No acute sinusitis.  Mastoid air cells:  No significant mastoid fluid.    IMPRESSION:  No acute intracranial findings. No hemorrhage.    Electronically signed by:  Rosa Smith MD  7/13/2023 4:53 AM CDT Workstation: [id number]                                     Medications   ketorolac injection 15 mg (0 mg Intravenous Hold 7/13/23 0430)   LIDOcaine (PF) 10 mg/ml (1%) injection 100 mg (has no administration in time range)   acyclovir (ZOVIRAX) 500 mg in dextrose 5 % (D5W) 100 mL IVPB (has no administration in time range)   vancomycin - pharmacy to dose (has no administration in time range)   cefTRIAXone (ROCEPHIN) 2 g in dextrose 5 % 100 mL IVPB (ready to mix) (has no administration in time range)   vancomycin in dextrose 5 % 1 gram/250 mL IVPB 1,000 mg (has no administration in time range)   sodium chloride 0.9% bolus 1,000 mL 1,000 mL (has no administration in time range)   sodium chloride 0.9% bolus 1,000 mL 1,000 mL (1,000 mLs Intravenous New Bag 7/13/23 0520)   prochlorperazine injection Soln 5 mg (5 mg Intravenous Given 7/13/23 0528)   fentaNYL 50 mcg/mL injection 50 mcg (50 mcg Intravenous Given 7/13/23 0549)   diazePAM injection 2 mg (2 mg Intravenous Given 7/13/23 0548)     Medical Decision Making:   Clinical Tests:   Lab Tests: Ordered and Reviewed       <> Summary of Lab: H&H  11 and 33.7 white count 8.8 ANC 6.7  BUN 33 potassium 3.4  Lactate 0.8  Normal coags  Mag 1.8  UPT negative  HIV negative  Respiratory viral panel negative  Urine without signs infection 3+ ketones 2+ blood 6 red blood cells    Blood cultures pending  CSF studies- CSF protein 25 glucose 60    Radiological Study: Ordered and Reviewed  ED Management:  Attending Note:  I provided a face to face evaluation of this patient.  I discussed the patient's care with the Resident.  I reviewed their note and agree with the history, physical, assessment, diagnosis, treatment, all procedures performed, interpretations and  plan provided by the Resident. My overall impression is meningitis/encephalitis with patient having fever neck stiffness and severe headaches.  Lab work has returned patient has a negative respiratory viral panel including negative COVID in flu negative HIV.  Normal white count mild anemia normal CMP.  Patient had a negative head CT prior to LP and had normal coags.   Immediately after LP patient was treated with antibiotics including vancomycin and Rocephin and given acyclovir.  She will be admitted to Hospital Medicine for further treatment and awaiting CSF and blood cultures.      Jesusita Dill M.D. 7/13/2023 7:12 AM              Attending Attestation:         Attending Critical Care:   Critical Care Times:   Direct Patient Care (initial evaluation, reassessments, and time considering the case)................................................................10 minutes.   Additional History from reviewing old medical records or taking additional history from the family, EMS, PCP, etc.......................5 minutes.   Ordering, Reviewing, and Interpreting Diagnostic Studies...............................................................................................................5 minutes.    Documentation..................................................................................................................................................................................10 minutes.   Consultation with other Physicians. .................................................................................................................................................5 minutes.   Consultation with the patient's family directly relating to the patient's condition, care, and DNR status (when patient unable)......5 minutes.   ==============================================================  Total Critical Care Time - exclusive of procedural time: 40 minutes.  ==============================================================  Critical care reasons: meningitis /encephalaits.   Critical care was time spent personally by me on the following activities: obtaining history from patient or relative, examination of patient, review of old charts, ordering lab, x-rays, and/or EKG, development of treatment plan with patient or relative, ordering and performing treatments and interventions, evaluation of patient's response to treatment, discussion with consultants, interpretation of cardiac measurements and re-evaluation of patient's conition.   Critical Care Condition: life-threatening                      Clinical Impression:   Final diagnoses:  [R50.9] Fever, unspecified fever cause  [R51.9] Acute nonintractable headache, unspecified headache type  [M43.6] Neck stiffness  [G03.9] Meningitis (Primary)        ED Disposition Condition    Admit Stable                Ilan Sharpe MD  Resident  07/13/23 0628       Ialn Sharpe MD  Resident  07/13/23 0656       Ilan Sharpe MD  Resident  07/13/23 0656       Jesusita Dill MD  07/13/23 0706

## 2023-07-13 NOTE — ED NOTES
ALERT AND ORIENTED. BACK LYING POST LP.  PUR WICK IN PLACE. ALERT AND ORIENTED.  AIRWAY CLEAR. NO RD. FLAT ABDOMEN. MAEW. SKIN W/D. CALL LIGHT IN REACH. NO SS BLEEDING.

## 2023-07-13 NOTE — PHARMACY MED REC
"    Admission Medication History     The home medication history was taken by Rosie Villagran.    You may go to "Admission" then "Reconcile Home Medications" tabs to review and/or act upon these items.     The home medication list has been updated by the Pharmacy department.   Please read ALL comments highlighted in yellow.   Please address this information as you see fit.    Feel free to contact us if you have any questions or require assistance.        Medications listed below were obtained from: Patient/family and [employer]  No current facility-administered medications on file prior to encounter.     Current Outpatient Medications on File Prior to Encounter   Medication Sig Dispense Refill    HYDROcodone-acetaminophen (NORCO) 7.5-325 mg per tablet Take 1 tablet by mouth every 4 to 6 hours as needed.         Rosie Villagran  [id number]              .        "

## 2023-07-14 LAB
EBV NA IGG SER IA-ACNC: 127 U/ML (ref 0–17.9)
EBV VCA IGM SER IA-ACNC: 31.3 U/ML (ref 0–17.9)
EBV VCA IGM SER IA-ACNC: <36 U/ML (ref 0–35.9)
SERVICE CMNT-IMP: ABNORMAL

## 2023-07-16 LAB
BACTERIA CSF CULT: NO GROWTH
GRAM STN SPEC: NORMAL
GRAM STN SPEC: NORMAL

## 2023-07-17 ENCOUNTER — HOSPITAL ENCOUNTER (INPATIENT)
Facility: HOSPITAL | Age: 28
LOS: 1 days | Discharge: HOME | DRG: 872 | End: 2023-07-19
Attending: EMERGENCY MEDICINE | Admitting: INTERNAL MEDICINE

## 2023-07-17 DIAGNOSIS — M54.2 NECK PAIN: ICD-10-CM

## 2023-07-17 DIAGNOSIS — R50.9 FEVER, UNKNOWN ORIGIN: ICD-10-CM

## 2023-07-17 DIAGNOSIS — B96.89 ACUTE BACTERIAL PHARYNGITIS: Primary | ICD-10-CM

## 2023-07-17 DIAGNOSIS — R50.9 FEVER: ICD-10-CM

## 2023-07-17 DIAGNOSIS — J02.8 ACUTE BACTERIAL PHARYNGITIS: Primary | ICD-10-CM

## 2023-07-17 DIAGNOSIS — R07.9 CHEST PAIN: ICD-10-CM

## 2023-07-17 PROBLEM — E87.1 HYPONATREMIA: Status: ACTIVE | Noted: 2023-07-17

## 2023-07-17 PROBLEM — E87.6 HYPOKALEMIA: Status: ACTIVE | Noted: 2023-07-17

## 2023-07-17 PROBLEM — R73.9 HYPERGLYCEMIA: Status: ACTIVE | Noted: 2023-07-17

## 2023-07-17 LAB
ALBUMIN SERPL BCP-MCNC: 3.5 G/DL (ref 3.5–5.2)
ALP SERPL-CCNC: 50 U/L (ref 55–135)
ALT SERPL W/O P-5'-P-CCNC: 18 U/L (ref 10–44)
ANION GAP SERPL CALC-SCNC: 2 MMOL/L (ref 8–16)
AST SERPL-CCNC: 19 U/L (ref 10–40)
BACTERIA #/AREA URNS HPF: NEGATIVE /HPF
BASOPHILS # BLD AUTO: 0.05 K/UL (ref 0–0.2)
BASOPHILS NFR BLD: 0.5 % (ref 0–1.9)
BILIRUB SERPL-MCNC: 0.6 MG/DL (ref 0.1–1)
BILIRUB UR QL STRIP: NEGATIVE
BUN SERPL-MCNC: 7 MG/DL (ref 6–20)
CALCIUM SERPL-MCNC: 8.6 MG/DL (ref 8.7–10.5)
CHLORIDE SERPL-SCNC: 100 MMOL/L (ref 95–110)
CLARITY UR: CLEAR
CO2 SERPL-SCNC: 29 MMOL/L (ref 23–29)
COLOR UR: YELLOW
CREAT SERPL-MCNC: 0.6 MG/DL (ref 0.5–1.4)
CREAT SERPL-MCNC: 0.7 MG/DL (ref 0.5–1.4)
CRP SERPL-MCNC: 30.91 MG/DL
DIFFERENTIAL METHOD: ABNORMAL
EOSINOPHIL # BLD AUTO: 0 K/UL (ref 0–0.5)
EOSINOPHIL NFR BLD: 0.2 % (ref 0–8)
ERYTHROCYTE [DISTWIDTH] IN BLOOD BY AUTOMATED COUNT: 13.1 % (ref 11.5–14.5)
EST. GFR  (NO RACE VARIABLE): >60 ML/MIN/1.73 M^2
GLUCOSE SERPL-MCNC: 162 MG/DL (ref 70–110)
GLUCOSE UR QL STRIP: NEGATIVE
GROUP A STREP, MOLECULAR: NEGATIVE
HCT VFR BLD AUTO: 33.5 % (ref 37–48.5)
HGB BLD-MCNC: 11 G/DL (ref 12–16)
HGB UR QL STRIP: ABNORMAL
HSV1 DNA SPEC QL NAA+PROBE: NEGATIVE
HSV2, PCR: NEGATIVE
HYALINE CASTS #/AREA URNS LPF: 1 /LPF
IMM GRANULOCYTES # BLD AUTO: 0.07 K/UL (ref 0–0.04)
IMM GRANULOCYTES NFR BLD AUTO: 0.7 % (ref 0–0.5)
INFLUENZA A, MOLECULAR: NEGATIVE
INFLUENZA B, MOLECULAR: NEGATIVE
KETONES UR QL STRIP: ABNORMAL
LACTATE SERPL-SCNC: 0.9 MMOL/L (ref 0.5–1.9)
LACTATE SERPL-SCNC: 1.4 MMOL/L (ref 0.5–1.9)
LEUKOCYTE ESTERASE UR QL STRIP: NEGATIVE
LYMPHOCYTES # BLD AUTO: 1.2 K/UL (ref 1–4.8)
LYMPHOCYTES NFR BLD: 12.3 % (ref 18–48)
MAGNESIUM SERPL-MCNC: 1.9 MG/DL (ref 1.6–2.6)
MCH RBC QN AUTO: 28.1 PG (ref 27–31)
MCHC RBC AUTO-ENTMCNC: 32.8 G/DL (ref 32–36)
MCV RBC AUTO: 86 FL (ref 82–98)
MICROSCOPIC COMMENT: ABNORMAL
MONOCYTES # BLD AUTO: 0.6 K/UL (ref 0.3–1)
MONOCYTES NFR BLD: 6.5 % (ref 4–15)
NEUTROPHILS # BLD AUTO: 7.7 K/UL (ref 1.8–7.7)
NEUTROPHILS NFR BLD: 79.8 % (ref 38–73)
NITRITE UR QL STRIP: NEGATIVE
NRBC BLD-RTO: 0 /100 WBC
PH UR STRIP: 7 [PH] (ref 5–8)
PLATELET # BLD AUTO: 244 K/UL (ref 150–450)
PMV BLD AUTO: 10.7 FL (ref 9.2–12.9)
POTASSIUM SERPL-SCNC: 3.1 MMOL/L (ref 3.5–5.1)
PROCALCITONIN SERPL IA-MCNC: 0.63 NG/ML (ref 0–0.5)
PROT SERPL-MCNC: 7.1 G/DL (ref 6–8.4)
PROT UR QL STRIP: ABNORMAL
RBC # BLD AUTO: 3.92 M/UL (ref 4–5.4)
RBC #/AREA URNS HPF: 11 /HPF (ref 0–4)
SAMPLE: NORMAL
SARS-COV-2 RDRP RESP QL NAA+PROBE: NEGATIVE
SODIUM SERPL-SCNC: 131 MMOL/L (ref 136–145)
SP GR UR STRIP: 1.01 (ref 1–1.03)
SPECIMEN SOURCE: NORMAL
SQUAMOUS #/AREA URNS HPF: 2 /HPF
URN SPEC COLLECT METH UR: ABNORMAL
UROBILINOGEN UR STRIP-ACNC: NEGATIVE EU/DL
WBC # BLD AUTO: 9.59 K/UL (ref 3.9–12.7)
WBC #/AREA URNS HPF: 1 /HPF (ref 0–5)

## 2023-07-17 PROCEDURE — 99285 EMERGENCY DEPT VISIT HI MDM: CPT | Mod: 25

## 2023-07-17 PROCEDURE — 94761 N-INVAS EAR/PLS OXIMETRY MLT: CPT

## 2023-07-17 PROCEDURE — G0378 HOSPITAL OBSERVATION PER HR: HCPCS

## 2023-07-17 PROCEDURE — 80053 COMPREHEN METABOLIC PANEL: CPT | Performed by: EMERGENCY MEDICINE

## 2023-07-17 PROCEDURE — 96365 THER/PROPH/DIAG IV INF INIT: CPT

## 2023-07-17 PROCEDURE — 83735 ASSAY OF MAGNESIUM: CPT | Performed by: EMERGENCY MEDICINE

## 2023-07-17 PROCEDURE — 96361 HYDRATE IV INFUSION ADD-ON: CPT

## 2023-07-17 PROCEDURE — 63600175 PHARM REV CODE 636 W HCPCS: Performed by: STUDENT IN AN ORGANIZED HEALTH CARE EDUCATION/TRAINING PROGRAM

## 2023-07-17 PROCEDURE — U0002 COVID-19 LAB TEST NON-CDC: HCPCS | Performed by: EMERGENCY MEDICINE

## 2023-07-17 PROCEDURE — 99205 OFFICE O/P NEW HI 60 MIN: CPT | Mod: ,,, | Performed by: STUDENT IN AN ORGANIZED HEALTH CARE EDUCATION/TRAINING PROGRAM

## 2023-07-17 PROCEDURE — 81001 URINALYSIS AUTO W/SCOPE: CPT | Performed by: EMERGENCY MEDICINE

## 2023-07-17 PROCEDURE — 25500020 PHARM REV CODE 255: Performed by: EMERGENCY MEDICINE

## 2023-07-17 PROCEDURE — 93010 EKG 12-LEAD: ICD-10-PCS | Mod: ,,, | Performed by: INTERNAL MEDICINE

## 2023-07-17 PROCEDURE — 36415 COLL VENOUS BLD VENIPUNCTURE: CPT | Performed by: EMERGENCY MEDICINE

## 2023-07-17 PROCEDURE — 99900035 HC TECH TIME PER 15 MIN (STAT)

## 2023-07-17 PROCEDURE — 63600175 PHARM REV CODE 636 W HCPCS: Performed by: EMERGENCY MEDICINE

## 2023-07-17 PROCEDURE — 87040 BLOOD CULTURE FOR BACTERIA: CPT | Mod: 59 | Performed by: EMERGENCY MEDICINE

## 2023-07-17 PROCEDURE — 36415 COLL VENOUS BLD VENIPUNCTURE: CPT | Performed by: INTERNAL MEDICINE

## 2023-07-17 PROCEDURE — 25000003 PHARM REV CODE 250: Performed by: STUDENT IN AN ORGANIZED HEALTH CARE EDUCATION/TRAINING PROGRAM

## 2023-07-17 PROCEDURE — 96367 TX/PROPH/DG ADDL SEQ IV INF: CPT

## 2023-07-17 PROCEDURE — 84145 PROCALCITONIN (PCT): CPT | Performed by: EMERGENCY MEDICINE

## 2023-07-17 PROCEDURE — 87207 SMEAR SPECIAL STAIN: CPT | Performed by: EMERGENCY MEDICINE

## 2023-07-17 PROCEDURE — 99205 PR OFFICE/OUTPT VISIT, NEW, LEVL V, 60-74 MIN: ICD-10-PCS | Mod: ,,, | Performed by: STUDENT IN AN ORGANIZED HEALTH CARE EDUCATION/TRAINING PROGRAM

## 2023-07-17 PROCEDURE — 93010 ELECTROCARDIOGRAM REPORT: CPT | Mod: ,,, | Performed by: INTERNAL MEDICINE

## 2023-07-17 PROCEDURE — 25000003 PHARM REV CODE 250: Performed by: EMERGENCY MEDICINE

## 2023-07-17 PROCEDURE — 85025 COMPLETE CBC W/AUTO DIFF WBC: CPT | Performed by: EMERGENCY MEDICINE

## 2023-07-17 PROCEDURE — 86140 C-REACTIVE PROTEIN: CPT | Performed by: INTERNAL MEDICINE

## 2023-07-17 PROCEDURE — 87502 INFLUENZA DNA AMP PROBE: CPT | Performed by: EMERGENCY MEDICINE

## 2023-07-17 PROCEDURE — 83605 ASSAY OF LACTIC ACID: CPT | Mod: 91 | Performed by: EMERGENCY MEDICINE

## 2023-07-17 PROCEDURE — 87651 STREP A DNA AMP PROBE: CPT | Performed by: EMERGENCY MEDICINE

## 2023-07-17 PROCEDURE — 93005 ELECTROCARDIOGRAM TRACING: CPT | Performed by: INTERNAL MEDICINE

## 2023-07-17 PROCEDURE — 25000003 PHARM REV CODE 250: Performed by: NURSE PRACTITIONER

## 2023-07-17 PROCEDURE — 87389 HIV-1 AG W/HIV-1&-2 AB AG IA: CPT | Performed by: STUDENT IN AN ORGANIZED HEALTH CARE EDUCATION/TRAINING PROGRAM

## 2023-07-17 RX ORDER — LANOLIN ALCOHOL/MO/W.PET/CERES
800 CREAM (GRAM) TOPICAL
Status: DISCONTINUED | OUTPATIENT
Start: 2023-07-17 | End: 2023-07-19 | Stop reason: HOSPADM

## 2023-07-17 RX ORDER — IBUPROFEN 200 MG
400 TABLET ORAL EVERY 6 HOURS PRN
COMMUNITY
End: 2023-07-28

## 2023-07-17 RX ORDER — SIMETHICONE 80 MG
1 TABLET,CHEWABLE ORAL 4 TIMES DAILY PRN
Status: DISCONTINUED | OUTPATIENT
Start: 2023-07-17 | End: 2023-07-19 | Stop reason: HOSPADM

## 2023-07-17 RX ORDER — TALC
6 POWDER (GRAM) TOPICAL NIGHTLY PRN
Status: DISCONTINUED | OUTPATIENT
Start: 2023-07-17 | End: 2023-07-19 | Stop reason: HOSPADM

## 2023-07-17 RX ORDER — OXYCODONE HYDROCHLORIDE 5 MG/1
10 TABLET ORAL EVERY 6 HOURS PRN
Status: DISCONTINUED | OUTPATIENT
Start: 2023-07-17 | End: 2023-07-19 | Stop reason: HOSPADM

## 2023-07-17 RX ORDER — SODIUM CHLORIDE 9 MG/ML
INJECTION, SOLUTION INTRAVENOUS CONTINUOUS
Status: DISCONTINUED | OUTPATIENT
Start: 2023-07-17 | End: 2023-07-18

## 2023-07-17 RX ORDER — PROCHLORPERAZINE EDISYLATE 5 MG/ML
5 INJECTION INTRAMUSCULAR; INTRAVENOUS EVERY 6 HOURS PRN
Status: DISCONTINUED | OUTPATIENT
Start: 2023-07-17 | End: 2023-07-19 | Stop reason: HOSPADM

## 2023-07-17 RX ORDER — SODIUM CHLORIDE 0.9 % (FLUSH) 0.9 %
10 SYRINGE (ML) INJECTION
Status: DISCONTINUED | OUTPATIENT
Start: 2023-07-17 | End: 2023-07-19 | Stop reason: HOSPADM

## 2023-07-17 RX ORDER — ONDANSETRON 2 MG/ML
4 INJECTION INTRAMUSCULAR; INTRAVENOUS EVERY 6 HOURS PRN
Status: DISCONTINUED | OUTPATIENT
Start: 2023-07-17 | End: 2023-07-19 | Stop reason: HOSPADM

## 2023-07-17 RX ORDER — IBUPROFEN 400 MG/1
400 TABLET ORAL EVERY 6 HOURS PRN
Status: DISCONTINUED | OUTPATIENT
Start: 2023-07-17 | End: 2023-07-19 | Stop reason: HOSPADM

## 2023-07-17 RX ORDER — NALOXONE HCL 0.4 MG/ML
0.02 VIAL (ML) INJECTION
Status: DISCONTINUED | OUTPATIENT
Start: 2023-07-17 | End: 2023-07-19 | Stop reason: HOSPADM

## 2023-07-17 RX ORDER — OXYCODONE HYDROCHLORIDE 5 MG/1
5 TABLET ORAL EVERY 6 HOURS PRN
Status: DISCONTINUED | OUTPATIENT
Start: 2023-07-17 | End: 2023-07-19 | Stop reason: HOSPADM

## 2023-07-17 RX ORDER — POLYETHYLENE GLYCOL 3350 17 G/17G
17 POWDER, FOR SOLUTION ORAL 2 TIMES DAILY PRN
Status: DISCONTINUED | OUTPATIENT
Start: 2023-07-17 | End: 2023-07-19 | Stop reason: HOSPADM

## 2023-07-17 RX ORDER — ACETAMINOPHEN 500 MG
1000 TABLET ORAL EVERY 6 HOURS PRN
COMMUNITY
End: 2023-07-28

## 2023-07-17 RX ORDER — ACETAMINOPHEN 500 MG
1000 TABLET ORAL EVERY 6 HOURS PRN
Status: DISCONTINUED | OUTPATIENT
Start: 2023-07-17 | End: 2023-07-19 | Stop reason: HOSPADM

## 2023-07-17 RX ADMIN — IOHEXOL 100 ML: 350 INJECTION, SOLUTION INTRAVENOUS at 01:07

## 2023-07-17 RX ADMIN — AMPICILLIN AND SULBACTAM 3 G: 2; 1 INJECTION, POWDER, FOR SOLUTION INTRAVENOUS at 10:07

## 2023-07-17 RX ADMIN — PIPERACILLIN AND TAZOBACTAM 4.5 G: 4; .5 INJECTION, POWDER, LYOPHILIZED, FOR SOLUTION INTRAVENOUS; PARENTERAL at 03:07

## 2023-07-17 RX ADMIN — OXYCODONE HYDROCHLORIDE 10 MG: 5 TABLET ORAL at 08:07

## 2023-07-17 RX ADMIN — SODIUM CHLORIDE: 0.9 INJECTION, SOLUTION INTRAVENOUS at 08:07

## 2023-07-17 RX ADMIN — POTASSIUM BICARBONATE 35 MEQ: 391 TABLET, EFFERVESCENT ORAL at 08:07

## 2023-07-17 RX ADMIN — ACETAMINOPHEN 1000 MG: 500 TABLET, FILM COATED ORAL at 08:07

## 2023-07-17 RX ADMIN — SODIUM CHLORIDE, SODIUM LACTATE, POTASSIUM CHLORIDE, AND CALCIUM CHLORIDE 1497 ML: .6; .31; .03; .02 INJECTION, SOLUTION INTRAVENOUS at 11:07

## 2023-07-17 RX ADMIN — POTASSIUM BICARBONATE 35 MEQ: 391 TABLET, EFFERVESCENT ORAL at 10:07

## 2023-07-17 NOTE — ASSESSMENT & PLAN NOTE
Infectious disease consult for evaluation by ED physician, she was given 1 dose of Zosyn in the ED, vital signs q.4 hours

## 2023-07-17 NOTE — CONSULTS
Consult Note  Infectious Disease    Reason for Consult:  fever     HPI: Kade Moreno is a 27 y.o. female very pleasant lady, with no significant past medical history, presenting with persistent fever at home.  She came to the ER on 07/13 4th fever, headache, stiff neck for 3 days.  LP then negative for meningitis.  She reports she had her wisdom tooth removed about 2 months ago, she denies antibiotics being prescribed.  She and her  went to [place] for 4th of July, and symptoms started few days upon return; neck stiffness, fever as high as 106 at home, and headache.  She also reports some sore throat, decreased appetite.  Denies nausea or vomit, no chest pain or cough, no shortness of breath, no abdominal pain, no dysuria increased urinary frequency, no change in bowel movements.  She mentioned she had 1 of her wisdom teeth removed 2 months ago, she was not prescribed antibiotics.  From last ER visit on 07/13 she was discharged on doxycycline which she did not  because she was told was not infectious.    Labs with white count of 9.5, left shift 79.8%, H&H 11/33.5, platelet count 244  Hypokalemia 3.1, normal LFTs, normal kidney function   Procalcitonin 0.63, positive   Group a strep flu a, B, COVID negative   UA with hematuria 11, no pyuria, negative for bacteria.    Chest x-ray clear  CT neck with contrast with small amount of fluid within the posterior nasal oropharynx associated with mucosal enhancement.  May represent pharyngitis.  There are enhancing nodular soft tissue densities within the retropharyngeal soft tissue bilaterally which likely represent mildly prominent lymph nodes.  Cervical lymphadenopathy, potentially reactive in etiology.    ID contacted from the ER, discussed with ER attending, will admit for IV antibiotics and further workup.      ID consult for fever.    Review of patient's allergies indicates:  No Known Allergies  History reviewed. No pertinent past medical  history.  Past Surgical History:   Procedure Laterality Date    neck abscess       Social History     Tobacco Use    Smoking status: Former     Types: Cigarettes    Smokeless tobacco: Never    Tobacco comments:     Just quit smoking   Substance Use Topics    Alcohol use: Yes     Alcohol/week: 5.0 standard drinks     Types: 4 Glasses of wine, 1 Shots of liquor per week     Comment: socially        Family History   Problem Relation Age of Onset    Hypertension Mother     Cancer Maternal Grandmother         breast    Hypertension Maternal Grandfather        Pertinent medications noted:     Review of Systems:   +chills, fever, sweats, weight loss  No change in vision, loss of vision or diplopia  No sinus congestion, purulent nasal discharge, post nasal drip or facial pain  Sore throat, neck pain  No chest pain, palpitations, syncope  No cough, sputum production, shortness of breath, dyspnea on exertion, pleurisy, hemoptysis  No dysphagia, odynophagia  No nausea, vomiting, diarrhea, constipation, blood in stool, or focal abd pain,    No dysuria, hesitancy, hematuria, retention, incontinence  No swelling of joints, redness of joints, injuries, or new focal pain  No unusual headaches, dizziness, vertigo, numbness, paresthesias, neuropathy, falls  No anxiety, depression, substance abuse, sleep disturbance  No bleeding, lymphadenopathy  No new rashes, lesions, or wounds    Outdoor activities:  Originally from [place], moved to the [place] in [place], 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  Just moved to [place] with her .  They just came back from [place] for 4th of July weekend.  Nonsmoker, occasional alcohol, no drugs.  Travel: None  Implants: None  Antibiotic History: Zosyn in the ER     EXAM & DIAGNOSTICS REVIEWED:   Vitals:     Temp:  [98.9 °F (37.2 °C)]   Temp: 98.9 °F (37.2 °C) (07/17/23 1039)  Pulse: 76 (07/17/23 1304)  Resp: 18 (07/17/23 1305)  BP: (!) 104/56 (07/17/23 1304)  SpO2: 100 %  (07/17/23 1304)    Intake/Output Summary (Last 24 hours) at 7/17/2023 1835  Last data filed at 7/17/2023 1700  Gross per 24 hour   Intake 1597 ml   Output --   Net 1597 ml       General:  In NAD. Alert and attentive, cooperative, a little uncomfortable with neck pain, on room air  Eyes:  Anicteric, PERRL  ENT:  Oropharynx with erythema, hypertrophied tonsils, no exudates noted  No ulcers, exudates, thrush, nares patent, dentition is good, s/p tooth extraction, no evidence of purulence  Neck:  Edema noted, b/l cervical mobile lymphadenopathy tender to palpation, supple  Lungs: Clear to auscultation b/l  Heart:  Tachycardic, S1/S2+, regular rhythm, no murmurs  Abd:  +BS, soft, non tender, non distended, no rebound  :  Voids, no flank tenderness  Musc:  Joints without effusion, swelling,  erythema, synovitis, ambulatory  Skin:  Warm, no rash  Wound:   Neuro:  Following commands, speech is clear, moves all extremities, no acute focal deficit   Psych:  Calm, cooperative  Lymphatic:     As above   Extrem: No LE edema b/l  VAD:  Peripheral IV       Isolation: None      General Labs reviewed:  Recent Labs   Lab 07/13/23  0514 07/17/23  1122   WBC 8.88 9.59   HGB 11.0* 11.0*   HCT 33.7* 33.5*    244       Recent Labs   Lab 07/13/23  0514 07/17/23  1122   * 131*   K 3.4* 3.1*   CL 99 100   CO2 23 29   BUN 8 7   CREATININE 0.7 0.7   CALCIUM 8.7 8.6*   PROT 7.2 7.1   BILITOT 1.0 0.6   ALKPHOS 40* 50*   ALT 12 18   AST 15 19     No results for input(s): CRP in the last 168 hours.  No results for input(s): SEDRATE in the last 168 hours.    Estimated Creatinine Clearance: 95.1 mL/min (based on SCr of 0.7 mg/dL).     Prior Micro:  Heme Aliquot mL 6.0    Appearance, CSF Clear Clear    Color, CSF Colorless Colorless    WBC, CSF 0 - 5 /cu mm 1    RBC, CSF 0 /cu mm 2 Abnormal     Lymphs, CSF 40 - 80 % 100 High    Glucose 60, protein 25, normal     Micro:  Microbiology Results (last 7 days)       Procedure Component Value  Units Date/Time    Group A Strep, Molecular [[id number]] Collected: 07/17/23 1320    Order Status: Completed Specimen: Throat Updated: 07/17/23 1438     Group A Strep, Molecular Negative     Comment: Arcanobacterium haemolyticum and Beta Streptococcus group C   and G will not be detected by this test method.  Please order   Throat Culture ([id number]) if suspected.         Blood culture x two cultures. Draw prior to antibiotics. [[id number]] Collected: 07/17/23 1307    Order Status: Sent Specimen: Blood Updated: 07/17/23 1312    Narrative:      Collection has been rescheduled by [email or web address] at 07/17/2023 11:23 Reason:   Patient not in room  Collection has been rescheduled by CH10 at 07/17/2023 11:23 Reason:   Patient not in room    Blood culture x two cultures. Draw prior to antibiotics. [[id number]] Collected: 07/17/23 1307    Order Status: Sent Specimen: Blood Updated: 07/17/23 1312    Narrative:      Collection has been rescheduled by CH10 at 07/17/2023 11:23 Reason:   Patient not in room  Collection has been rescheduled by CH10 at 07/17/2023 11:23 Reason:   Patient not in room            Imaging Reviewed:  CXR   CT neck w/ IV contrast:  Small amount of fluid within the posterior nasal/oropharynx associated with mucosal enhancement. This appearance may represent pharyngitis. There are enhancing nodular soft tissue densities within the retropharyngeal soft tissues bilaterally which likely represent mildly prominent lymph nodes. A small node located towards the right of midline appears to contain central cystic degeneration/necrosis.   Cervical lymphadenopathy, potentially reactive in etiology. Clinical follow-up is indicated.   Mild prominence of the pharyngeal and lingual tonsils demonstrating heterogeneous enhancement.     Cardiology: EKG: Qtc: 411ms       IMPRESSION & PLAN     Sepsis, rule out retropharyngeal abscess, recent tooth extraction   Procal 0.63, positive   Group A Strep x 2 negative  Blood cultures x 2 in  process    Recommendations:  Throat culture  MRSA nasal swab   CRP added-on  Start Unasyn 3g IV q6h   Vancomycin IV, keep level 15-20  HIV 4th gen, LDH ordered  Follow cultures     Will follow     D/w patient,  at bedside, ER nursing, NP    Medical Decision Making during this encounter was  [_] Low Complexity  [_] Moderate Complexity  [xx] High Complexity

## 2023-07-17 NOTE — HPI
Kade Moreno is a 27-year-old female with no chronic medical problems who presents the emergency room complaining of fever.  Patient states that she has been having 103-106 fever for the last 7 days.  She said it always occurs when she is sleeping.  Her significant other wakes her up because she is restless and checked her temperature.  She said sometimes it happens during the day when she lays down to take a nap.  Fevers accompanied by frequent bad headaches, poor appetite and neck stiffness.  She said she had an episode where she felt weak and passed out 2 days ago.  She feels like she has a sore throat and has difficulty swallowing.  She had an abscess in her neck 2 years ago but it did not feel anything like this.  She has been having difficulty with a wisdom tooth and had pulled last Tuesday.  She said her symptoms started the Monday before that.  There are no other symptoms such as shortness of breath or cough, sinus congestion, dizziness, swelling is, dysuria, abdominal pain, nausea vomiting or diarrhea.  She was in the emergency room on the 13 and had a lumbar puncture.  She was given a full workup was fluid bolus and doses of vancomycin, ceftriaxone and acyclovir.  She was discharged home with doxycycline which she did not take.  So far all results coming back from lumbar puncture negative.  Lab work today with WBC 9.59, platelets 244, H&H 11.0/33.5, sodium 131, potassium 3.1, serum bicarb 9, BUN/CR 17/0.7, glucose 1462, ALT/AST 18/19, magnesium 1.9, procalcitonin 0.63, lactic acid 0.9.  Temperature 98.9°, heart rate 76, blood pressure 104/56, respiratory rate 18 O2 sat 100% on room air. CT soft tissue neck with cervical lymphadenopathy unclear etiology and mild prominence of pharyngeal and lingual tonsils with heterogeneous enhancement. ED physician spoke with Infectious Disease to come and see the patient.  She will be admitted to the hospitalist service for further evaluation and treatment with  antibiotics per Infectious Disease.

## 2023-07-17 NOTE — SUBJECTIVE & OBJECTIVE
History reviewed. No pertinent past medical history.    Past Surgical History:   Procedure Laterality Date    neck abscess         Review of patient's allergies indicates:  No Known Allergies    No current facility-administered medications on file prior to encounter.     Current Outpatient Medications on File Prior to Encounter   Medication Sig    acetaminophen (TYLENOL EXTRA STRENGTH) 500 MG tablet Take 1,000 mg by mouth every 6 (six) hours as needed for Pain.    HYDROcodone-acetaminophen (NORCO) 7.5-325 mg per tablet Take 1 tablet by mouth every 4 to 6 hours as needed.    ibuprofen (ADVIL,MOTRIN) 200 MG tablet Take 400 mg by mouth every 6 (six) hours as needed for Pain.    doxycycline (VIBRAMYCIN) 100 MG Cap Take 1 capsule (100 mg total) by mouth 2 (two) times daily. for 7 days (Patient taking differently: Take 100 mg by mouth 2 (two) times daily. NEW Rx - NOT YET STARTED)     Family History       Problem Relation (Age of Onset)    Cancer Maternal Grandmother    Hypertension Mother, Maternal Grandfather          Tobacco Use    Smoking status: Former     Types: Cigarettes    Smokeless tobacco: Never    Tobacco comments:     Just quit smoking   Substance and Sexual Activity    Alcohol use: Yes     Alcohol/week: 5.0 standard drinks     Types: 4 Glasses of wine, 1 Shots of liquor per week     Comment: socially    Drug use: Never    Sexual activity: Yes     Partners: Male     Birth control/protection: Condom     Review of Systems   All other systems reviewed and are negative.  Twelve point review of systems obtained and negative except as stated above in HPI      Objective:     Vital Signs (Most Recent):  Temp: 98.9 °F (37.2 °C) (07/17/23 1039)  Pulse: 76 (07/17/23 1304)  Resp: 18 (07/17/23 1305)  BP: (!) 104/56 (07/17/23 1304)  SpO2: 100 % (07/17/23 1304) Vital Signs (24h Range):  Temp:  [98.9 °F (37.2 °C)] 98.9 °F (37.2 °C)  Pulse:  [] 76  Resp:  [16-19] 18  SpO2:  [97 %-100 %] 100 %  BP: (101-109)/(56-68)  104/56     Weight: 49.9 kg (110 lb)  Body mass index is 20.12 kg/m².     Physical Exam  Vitals and nursing note reviewed.   Constitutional:       General: She is sleeping.      Appearance: Normal appearance. She is well-developed.      Comments: Young adult female, lying in bed sleeping, awakens to voice.  Oriented, conversant in good historian.   HENT:      Head: Normocephalic.      Right Ear: Hearing normal.      Left Ear: Hearing normal.      Nose: Nose normal.      Mouth/Throat:      Lips: Pink.      Mouth: Mucous membranes are moist.      Comments: Unable to visualize posterior pharynx.  Eyes:      General: Lids are normal. Vision grossly intact. Gaze aligned appropriately.      Pupils: Pupils are equal, round, and reactive to light.   Neck:      Comments: No tenderness to palpation, tenderness bilateral cervical area, pain when trying to touch chin to chest  Cardiovascular:      Rate and Rhythm: Normal rate and regular rhythm.      Pulses:           Radial pulses are 2+ on the right side and 2+ on the left side.        Dorsalis pedis pulses are 2+ on the right side and 2+ on the left side.      Heart sounds: Normal heart sounds. No murmur heard.  Pulmonary:      Effort: Pulmonary effort is normal.      Breath sounds: Normal breath sounds. No decreased breath sounds, wheezing, rhonchi or rales.   Abdominal:      General: Abdomen is flat. Bowel sounds are normal.      Palpations: Abdomen is soft.      Tenderness: There is no abdominal tenderness.   Musculoskeletal:         General: Normal range of motion.      Right lower leg: No edema.      Left lower leg: No edema.      Comments: Moves all extremities with good equal strength   Lymphadenopathy:      Cervical: Cervical adenopathy present.   Skin:     General: Skin is warm and dry.      Capillary Refill: Capillary refill takes less than 2 seconds.   Neurological:      General: No focal deficit present.      Mental Status: She is oriented to person, place, and  time and easily aroused.      GCS: GCS eye subscore is 4. GCS verbal subscore is 5. GCS motor subscore is 6.      Sensory: Sensation is intact.      Motor: Motor function is intact.   Psychiatric:         Attention and Perception: Attention and perception normal.         Mood and Affect: Mood and affect normal.         Speech: Speech normal.         Behavior: Behavior normal. Behavior is cooperative.         Thought Content: Thought content normal.         Cognition and Memory: Cognition and memory normal.         Judgment: Judgment normal.            CRANIAL NERVES     CN III, IV, VI   Pupils are equal, round, and reactive to light.     Significant Labs:     Bilirubin:   Recent Labs   Lab 07/13/23  0514 07/17/23  1122   BILITOT 1.0 0.6     CBC:   Recent Labs   Lab 07/17/23  1122   WBC 9.59   HGB 11.0*   HCT 33.5*        CMP:   Recent Labs   Lab 07/17/23  1122   *   K 3.1*      CO2 29   *   BUN 7   CREATININE 0.7   CALCIUM 8.6*   PROT 7.1   ALBUMIN 3.5   BILITOT 0.6   ALKPHOS 50*   AST 19   ALT 18   ANIONGAP 2*     Lactic Acid:   Recent Labs   Lab 07/17/23  1122 07/17/23  1328   LACTATE 1.4 0.9     Magnesium:   Recent Labs   Lab 07/17/23  1122   MG 1.9     Urine Studies:   Recent Labs   Lab 07/17/23  1147   COLORU Yellow   APPEARANCEUA Clear   PHUR 7.0   SPECGRAV 1.010   PROTEINUA Trace*   GLUCUA Negative   KETONESU 2+*   BILIRUBINUA Negative   OCCULTUA 1+*   NITRITE Negative   UROBILINOGEN Negative   LEUKOCYTESUR Negative   RBCUA 11*   WBCUA 1   BACTERIA Negative   SQUAMEPITHEL 2   HYALINECASTS 1       Significant Imaging: I have reviewed all pertinent imaging results/findings within the past 24 hours.      12 LEAD EKG 07/17/2023 1117  Vent. Rate : 088 BPM     Atrial Rate : 088 BPM      P-R Int : 138 ms          QRS Dur : 086 ms       QT Int : 340 ms       P-R-T Axes : 052 078 045 degrees      QTc Int : 411 ms     Normal sinus rhythm   Normal ECG   When compared with ECG of 02-AUG-2022  21:11,   No significant change was found     X-Ray Chest AP Portable [[id number]]Collected: 07/17/23 1133     FINDINGS: Portable chest radiograph at 1142 hours compared to 07/13/2023 shows the cardiomediastinal silhouette and pulmonary vasculature are within normal limits.     The lungs are normally expanded, with no consolidation, large pleural effusion, or evidence of pulmonary edema. No confluent infiltrates or pneumothorax. There are no significant osseous abnormalities.     IMPRESSION: No evidence of active cardiopulmonary disease.     CT Soft Tissue Neck With Contrast [[id number]]Collected: 07/17/23 1352 d middle ear cavities appear unremarkable.     There is a small amount of fluid observed posteriorly within the nasopharynx and oropharynx associated with prominent mucosal thickening. This appearance could represent pharyngitis. There is some enhancing nodular soft tissue densities observed within the retropharyngeal soft tissues bilaterally which likely represent mildly prominent lymph nodes. At least one of these to the right of midline at the C1 level demonstrates a nonenhancing center which may indicate central necrosis. There are additional mildly enlarged upper anterior cervical chain lymph nodes bilaterally measuring up to approximately 15 mm in short axis dimension and there are mildly enlarged posterior cervical chain lymph nodes measuring up to about 11 mm in short axis dimension on the right.     There is mild prominence of the palatine and lingual tonsils which demonstrate heterogeneous enhancement but without discrete abscess formation. There are otherwise no fluid collections identified.     Small calcifications are observed within the adenoids. The remainder of the pharyngeal and laryngeal soft tissues appear unremarkable and the upper airway is patent. The epiglottis appears unremarkable. The major salivary glands demonstrate no abnormalities. The major vascular structures enhance appropriately.  The infrahyoid portion of the neck including the thyroid gland appears unremarkable. The lung apices are appropriately aerated.     IMPRESSION:     Small amount of fluid within the posterior nasal/oropharynx associated with mucosal enhancement. This appearance may represent pharyngitis. There are enhancing nodular soft tissue densities within the retropharyngeal soft tissues bilaterally which likely represent mildly prominent lymph nodes. A small node located towards the right of midline appears to contain central cystic degeneration/necrosis.     Cervical lymphadenopathy, potentially reactive in etiology. Clinical follow-up is indicated.     Mild prominence of the pharyngeal and lingual tonsils demonstrating heterogeneous enhancement.

## 2023-07-17 NOTE — ED PROVIDER NOTES
Encounter Date: 7/17/2023       History     Chief Complaint   Patient presents with    Neck Pain    Fever     Fever x 1 week of unknown origin. Seen here for same. Now complaining of neck pain/swelling. No fever in triage.      Patient presents complaining of continued fever and neck pain.  Patient was recently evaluated in the emergency department had a negative lumbar puncture.  Patient prescribed doxycycline but not take it.  Patient persists with sore throat and neck discomfort.  Patient still having fevers at home up to 106 per the patient.    Review of patient's allergies indicates:  No Known Allergies  No past medical history on file.  No past surgical history on file.  Family History   Problem Relation Age of Onset    Cancer Maternal Grandmother         breast     Social History     Tobacco Use    Smoking status: Never    Smokeless tobacco: Never   Substance Use Topics    Alcohol use: Yes     Alcohol/week: 5.0 standard drinks     Types: 4 Glasses of wine, 1 Shots of liquor per week    Drug use: Never     Review of Systems   All other systems reviewed and are negative.    Physical Exam     Initial Vitals [07/17/23 1039]   BP Pulse Resp Temp SpO2   107/63 (!) 112 18 98.9 °F (37.2 °C) 97 %      MAP       --         Physical Exam    Nursing note and vitals reviewed.  Constitutional: She appears well-developed and well-nourished.   Pleasant, polite   HENT:   Head: Normocephalic and atraumatic.   Eyes: EOM are normal.   Neck:   There is tender bilateral anterior cervical lymphadenopathy   Normal range of motion.  Cardiovascular:  Intact distal pulses.           Pulmonary/Chest: No respiratory distress.   Musculoskeletal:      Cervical back: Normal range of motion.     Neurological: She is alert and oriented to person, place, and time.   Skin: Skin is warm and dry. Capillary refill takes less than 2 seconds.   Psychiatric: She has a normal mood and affect. Her behavior is normal. Judgment and thought content  normal.       ED Course   Procedures  Labs Reviewed   CBC W/ AUTO DIFFERENTIAL - Abnormal; Notable for the following components:       Result Value    RBC 3.92 (*)     Hemoglobin 11.0 (*)     Hematocrit 33.5 (*)     Immature Granulocytes 0.7 (*)     Immature Grans (Abs) 0.07 (*)     Gran % 79.8 (*)     Lymph % 12.3 (*)     All other components within normal limits   COMPREHENSIVE METABOLIC PANEL - Abnormal; Notable for the following components:    Sodium 131 (*)     Potassium 3.1 (*)     Glucose 162 (*)     Calcium 8.6 (*)     Alkaline Phosphatase 50 (*)     Anion Gap 2 (*)     All other components within normal limits   URINALYSIS, REFLEX TO URINE CULTURE - Abnormal; Notable for the following components:    Protein, UA Trace (*)     Ketones, UA 2+ (*)     Occult Blood UA 1+ (*)     All other components within normal limits    Narrative:     Specimen Source->Urine   PROCALCITONIN - Abnormal; Notable for the following components:    Procalcitonin 0.63 (*)     All other components within normal limits   URINALYSIS MICROSCOPIC - Abnormal; Notable for the following components:    RBC, UA 11 (*)     All other components within normal limits    Narrative:     Specimen Source->Urine   GROUP A STREP, MOLECULAR   CULTURE, BLOOD    Narrative:     Collection has been rescheduled by [facility] at 07/17/2023 11:23 Reason:   Patient not in room  Collection has been rescheduled by [facility] at 07/17/2023 11:23 Reason:   Patient not in room   CULTURE, BLOOD    Narrative:     Collection has been rescheduled by [facility] at 07/17/2023 11:23 Reason:   Patient not in room  Collection has been rescheduled by [facility] at 07/17/2023 11:23 Reason:   Patient not in room   LACTIC ACID, PLASMA   LACTIC ACID, PLASMA   MAGNESIUM   INFLUENZA A AND B ANTIGEN    Narrative:     Specimen Source->Nasopharyngeal Swab   SARS-COV-2 RNA AMPLIFICATION, QUAL   BLOOD PARASITE   ISTAT CREATININE   POCT CREATININE        ECG Results              EKG 12-lead (In process)  Result  time 07/17/23 11:52:27      In process by Interface, Lab In [facility] (07/17/23 11:52:27)                   Narrative:    Test Reason : R50.9,    Vent. Rate : 088 BPM     Atrial Rate : 088 BPM     P-R Int : 138 ms          QRS Dur : 086 ms      QT Int : 340 ms       P-R-T Axes : 052 078 045 degrees     QTc Int : 411 ms    Normal sinus rhythm  Normal ECG  When compared with ECG of 02-AUG-2022 21:11,  No significant change was found    Referred By: [email or web address]   SELF           Confirmed By:                                   Imaging Results              CT Soft Tissue Neck With Contrast (Final result)  Result time 07/17/23 14:44:50      Final result by Marquise Lopez IV, MD (07/17/23 14:44:50)                   Narrative:    CMS MANDATED QUALITY DATA - CT RADIATION 436    All CT scans at this facility utilize dose modulation, iterative reconstruction, and/or weight based dosing when appropriate to reduce radiation dose to as low as reasonably achievable.    CT of the neck with contrast    HISTORY: Neck pain and fever.    The examination utilizes 100 mL Omnipaque 350.    The visualized portion of the skull base, paranasal sinuses, mastoid air cells and middle ear cavities appear unremarkable.    There is a small amount of fluid observed posteriorly within the nasopharynx and oropharynx associated with prominent mucosal thickening. This appearance could represent pharyngitis. There is some enhancing nodular soft tissue densities observed within the retropharyngeal soft tissues bilaterally which likely represent mildly prominent lymph nodes. At least one of these to the right of midline at the C1 level demonstrates a nonenhancing center which may indicate central necrosis. There are additional mildly enlarged upper anterior cervical chain lymph nodes bilaterally measuring up to approximately 15 mm in short axis dimension and there are mildly enlarged posterior cervical chain lymph nodes measuring up to about 11 mm in short  axis dimension on the right.    There is mild prominence of the palatine and lingual tonsils which demonstrate heterogeneous enhancement but without discrete abscess formation. There are otherwise no fluid collections identified.    Small calcifications are observed within the adenoids. The remainder of the pharyngeal and laryngeal soft tissues appear unremarkable and the upper airway is patent. The epiglottis appears unremarkable. The major salivary glands demonstrate no abnormalities. The major vascular structures enhance appropriately. The infrahyoid portion of the neck including the thyroid gland appears unremarkable. The lung apices are appropriately aerated.    IMPRESSION:    Small amount of fluid within the posterior nasal/oropharynx associated with mucosal enhancement. This appearance may represent pharyngitis. There are enhancing nodular soft tissue densities within the retropharyngeal soft tissues bilaterally which likely represent mildly prominent lymph nodes. A small node located towards the right of midline appears to contain central cystic degeneration/necrosis.    Cervical lymphadenopathy, potentially reactive in etiology. Clinical follow-up is indicated.    Mild prominence of the pharyngeal and lingual tonsils demonstrating heterogeneous enhancement.    Electronically signed by:  Marquise Lopez MD  7/17/2023 2:44 PM CDT Workstation: [id number]                                     X-Ray Chest AP Portable (Final result)  Result time 07/17/23 11:55:33      Final result by Ben Figueroa MD (07/17/23 11:55:33)                   Narrative:    HISTORY: Sepsis    FINDINGS: Portable chest radiograph at 1142 hours compared to 07/13/2023 shows the cardiomediastinal silhouette and pulmonary vasculature are within normal limits.    The lungs are normally expanded, with no consolidation, large pleural effusion, or evidence of pulmonary edema. No confluent infiltrates or pneumothorax. There are no significant  osseous abnormalities.    IMPRESSION: No evidence of active cardiopulmonary disease.    Electronically signed by:  Ben Figueroa MD  7/17/2023 11:55 AM CDT Workstation: [id number]                                     Medications   lactated ringers bolus 1,497 mL (0 mLs Intravenous Stopped 7/17/23 1238)   iohexoL (OMNIPAQUE 350) injection 100 mL (100 mLs Intravenous Given 7/17/23 1353)   piperacillin-tazobactam 4.5 g in dextrose 5 % 100 mL IVPB (ready to mix) (4.5 g Intravenous New Bag 7/17/23 1530)     Medical Decision Making:   History:   Old Records Summarized: records from previous admission(s).  Initial Assessment:   Patient appears in no apparent distress  Differential Diagnosis:   Considerations include but are not limited to bacteremia, retropharyngeal abscess, pharyngitis, viral syndrome  Clinical Tests:   Lab Tests: Reviewed and Ordered  Radiological Study: Ordered and Reviewed  Medical Tests: Reviewed and Ordered  ED Management:  MDM    Patient presents for emergent evaluation of acute fever that poses a possible threat to life and/or bodily function.    In the ED patient found to have acute fever.   I ordered labs and personally reviewed them.  Labs significant for elevated procalcitonin.      I ordered CT scan and personally reviewed it and reviewed the radiologist interpretation.  CT significant for lymphadenopathy present.      Admission MDM  I discussed the patient presentation labs, ekg, X-rays, CT findings with the consultant(s) doctors worse infectious Disease recommends Zosyn and hospitalization for further workup   Patient was managed in the ED with IV Zosyn.    The response to treatment was improved.    Patient required emergent consultation to hospital for admission.                        Clinical Impression:   Final diagnoses:  [R50.9] Fever  [M54.2] Neck pain (Primary)        ED Disposition Condition    Admit Stable                Mat Baker MD  07/17/23 9290

## 2023-07-17 NOTE — FIRST PROVIDER EVALUATION
"Medical screening examination initiated.  I have conducted a focused provider triage encounter, findings are as follows:    Brief history of present illness:  Fever, neck pain    Vitals:    07/17/23 1039   BP: 107/63   BP Location: Left arm   Patient Position: Sitting   Pulse: (!) 112   Resp: 18   Temp: 98.9 °F (37.2 °C)   TempSrc: Oral   SpO2: 97%   Weight: 49.9 kg (110 lb)   Height: 5' 2" (1.575 m)       Pertinent physical exam:  Patient reports that she is been having fever, neck pain for proximal weak she was seen here on the 13th had a lumbar puncture CSF negative, bump cultures reviewed and negative.  Patient was discharged home with doxy however she states she is not taking.   reports that she continues to have fever and states she had a temp of 105° last p.m. patient appears well    Brief workup plan:  Labs    Preliminary workup initiated; this workup will be continued and followed by the physician or advanced practice provider that is assigned to the patient when roomed.  "

## 2023-07-17 NOTE — PHARMACY MED REC
"      Admission Medication History     The home medication history was taken by Rosie Villagran.    You may go to "Admission" then "Reconcile Home Medications" tabs to review and/or act upon these items.     The home medication list has been updated by the Pharmacy department.   Please read ALL comments highlighted in yellow.   Please address this information as you see fit.    Feel free to contact us if you have any questions or require assistance.        Medications listed below were obtained from: Patient/family and Analytic software- [email or web address]  No current facility-administered medications on file prior to encounter.     Current Outpatient Medications on File Prior to Encounter   Medication Sig Dispense Refill    acetaminophen (TYLENOL EXTRA STRENGTH) 500 MG tablet Take 1,000 mg by mouth every 6 (six) hours as needed for Pain.      HYDROcodone-acetaminophen (NORCO) 7.5-325 mg per tablet Take 1 tablet by mouth every 4 to 6 hours as needed.      ibuprofen (ADVIL,MOTRIN) 200 MG tablet Take 400 mg by mouth every 6 (six) hours as needed for Pain.      doxycycline (VIBRAMYCIN) 100 MG Cap Take 1 capsule (100 mg total) by mouth 2 (two) times daily. for 7 days (Patient taking differently: Take 100 mg by mouth 2 (two) times daily. NEW Rx - NOT YET STARTED) 14 capsule 0       Rosie Villagran  [id number]            .        "

## 2023-07-17 NOTE — H&P
Formerly [facility], later [facility] - Emergency Dept  Hospital Medicine  History & Physical    Patient Name: Kade Moreno  MRN: [id number]  Patient Class: OP- Observation  Admission Date: 7/17/2023  Attending Physician: Gerson Leblanc MD   Primary Care Provider: Primary Doctor No         Patient information was obtained from patient, spouse/SO and ER records.     Subjective:     Principal Problem:Fever of unknown origin    Chief Complaint:   Chief Complaint   Patient presents with    Neck Pain    Fever     Fever x 1 week of unknown origin. Seen here for same. Now complaining of neck pain/swelling. No fever in triage.         HPI: Kade Moreno is a 27-year-old female with no chronic medical problems who presents the emergency room complaining of fever.  Patient states that she has been having 103-106 fever for the last 7 days.  She said it always occurs when she is sleeping.  Her significant other wakes her up because she is restless and checked her temperature.  She said sometimes it happens during the day when she lays down to take a nap.  Fevers accompanied by frequent bad headaches, poor appetite and neck stiffness.  She said she had an episode where she felt weak and passed out 2 days ago.  She feels like she has a sore throat and has difficulty swallowing.  She had an abscess in her neck 2 years ago but it did not feel anything like this.  She has been having difficulty with a wisdom tooth and had pulled last Tuesday.  She said her symptoms started the Monday before that.  There are no other symptoms such as shortness of breath or cough, sinus congestion, dizziness, swelling is, dysuria, abdominal pain, nausea vomiting or diarrhea.  She was in the emergency room on the 13 and had a lumbar puncture.  She was given a full workup was fluid bolus and doses of vancomycin, ceftriaxone and acyclovir.  She was discharged home with doxycycline which she did not take.  So far all results coming back from lumbar  puncture negative.  Lab work today with WBC 9.59, platelets 244, H&H 11.0/33.5, sodium 131, potassium 3.1, serum bicarb 9, BUN/CR 17/0.7, glucose 1462, ALT/AST 18/19, magnesium 1.9, procalcitonin 0.63, lactic acid 0.9.  Temperature 98.9°, heart rate 76, blood pressure 104/56, respiratory rate 18 O2 sat 100% on room air. CT soft tissue neck with cervical lymphadenopathy unclear etiology and mild prominence of pharyngeal and lingual tonsils with heterogeneous enhancement. ED physician spoke with Infectious Disease to come and see the patient.  She will be admitted to the hospitalist service for further evaluation and treatment with antibiotics per Infectious Disease.      History reviewed. No pertinent past medical history.    Past Surgical History:   Procedure Laterality Date    neck abscess         Review of patient's allergies indicates:  No Known Allergies    Scheduled Meds:   potassium bicarbonate  35 mEq Oral Q2H     Continuous Infusions:   sodium chloride 0.9%       PRN Meds:.acetaminophen, ibuprofen, magnesium oxide, magnesium oxide, melatonin, naloxone, ondansetron, oxyCODONE, oxyCODONE, polyethylene glycol, potassium bicarbonate, potassium bicarbonate, potassium bicarbonate, prochlorperazine, simethicone, sodium chloride 0.9%      Current Outpatient Medications on File Prior to Encounter   Medication Sig    acetaminophen (TYLENOL EXTRA STRENGTH) 500 MG tablet Take 1,000 mg by mouth every 6 (six) hours as needed for Pain.    HYDROcodone-acetaminophen (NORCO) 7.5-325 mg per tablet Take 1 tablet by mouth every 4 to 6 hours as needed.    ibuprofen (ADVIL,MOTRIN) 200 MG tablet Take 400 mg by mouth every 6 (six) hours as needed for Pain.    doxycycline (VIBRAMYCIN) 100 MG Cap Take 1 capsule (100 mg total) by mouth 2 (two) times daily. for 7 days (Patient taking differently: Take 100 mg by mouth 2 (two) times daily. NEW Rx - NOT YET STARTED)     Family History       Problem Relation (Age of Onset)    Cancer  Maternal Grandmother    Hypertension Mother, Maternal Grandfather          Tobacco Use    Smoking status: Former     Types: Cigarettes    Smokeless tobacco: Never    Tobacco comments:     Just quit smoking   Substance and Sexual Activity    Alcohol use: Yes     Alcohol/week: 5.0 standard drinks     Types: 4 Glasses of wine, 1 Shots of liquor per week     Comment: socially    Drug use: Never    Sexual activity: Yes     Partners: Male     Birth control/protection: Condom     Review of Systems   All other systems reviewed and are negative.  Twelve point review of systems obtained and negative except as stated above in HPI      Objective:     Vital Signs (Most Recent):  Temp: 98.9 °F (37.2 °C) (07/17/23 1039)  Pulse: 76 (07/17/23 1304)  Resp: 18 (07/17/23 1305)  BP: (!) 104/56 (07/17/23 1304)  SpO2: 100 % (07/17/23 1304) Vital Signs (24h Range):  Temp:  [98.9 °F (37.2 °C)] 98.9 °F (37.2 °C)  Pulse:  [] 76  Resp:  [16-19] 18  SpO2:  [97 %-100 %] 100 %  BP: (101-109)/(56-68) 104/56     Weight: 49.9 kg (110 lb)  Body mass index is 20.12 kg/m².     Physical Exam  Vitals and nursing note reviewed.   Constitutional:       General: She is sleeping.      Appearance: Normal appearance. She is well-developed.      Comments: Young adult female, lying in bed sleeping, awakens to voice.  Oriented, conversant in good historian.   HENT:      Head: Normocephalic.      Right Ear: Hearing normal.      Left Ear: Hearing normal.      Nose: Nose normal.      Mouth/Throat:      Lips: Pink.      Mouth: Mucous membranes are moist.      Comments: Unable to visualize posterior pharynx.  Eyes:      General: Lids are normal. Vision grossly intact. Gaze aligned appropriately.      Pupils: Pupils are equal, round, and reactive to light.   Neck:      Comments: No tenderness to palpation, tenderness bilateral cervical area, pain when trying to touch chin to chest  Cardiovascular:      Rate and Rhythm: Normal rate and regular rhythm.      Pulses:            Radial pulses are 2+ on the right side and 2+ on the left side.        Dorsalis pedis pulses are 2+ on the right side and 2+ on the left side.      Heart sounds: Normal heart sounds. No murmur heard.  Pulmonary:      Effort: Pulmonary effort is normal.      Breath sounds: Normal breath sounds. No decreased breath sounds, wheezing, rhonchi or rales.   Abdominal:      General: Abdomen is flat. Bowel sounds are normal.      Palpations: Abdomen is soft.      Tenderness: There is no abdominal tenderness.   Musculoskeletal:         General: Normal range of motion.      Right lower leg: No edema.      Left lower leg: No edema.      Comments: Moves all extremities with good equal strength   Lymphadenopathy:      Cervical: Cervical adenopathy present.   Skin:     General: Skin is warm and dry.      Capillary Refill: Capillary refill takes less than 2 seconds.   Neurological:      General: No focal deficit present.      Mental Status: She is oriented to person, place, and time and easily aroused.      GCS: GCS eye subscore is 4. GCS verbal subscore is 5. GCS motor subscore is 6.      Sensory: Sensation is intact.      Motor: Motor function is intact.   Psychiatric:         Attention and Perception: Attention and perception normal.         Mood and Affect: Mood and affect normal.         Speech: Speech normal.         Behavior: Behavior normal. Behavior is cooperative.         Thought Content: Thought content normal.         Cognition and Memory: Cognition and memory normal.         Judgment: Judgment normal.            CRANIAL NERVES     CN III, IV, VI   Pupils are equal, round, and reactive to light.     Significant Labs:     Bilirubin:   Recent Labs   Lab 07/13/23  0514 07/17/23  1122   BILITOT 1.0 0.6     CBC:   Recent Labs   Lab 07/17/23  1122   WBC 9.59   HGB 11.0*   HCT 33.5*        CMP:   Recent Labs   Lab 07/17/23  1122   *   K 3.1*      CO2 29   *   BUN 7   CREATININE 0.7   CALCIUM  8.6*   PROT 7.1   ALBUMIN 3.5   BILITOT 0.6   ALKPHOS 50*   AST 19   ALT 18   ANIONGAP 2*     Lactic Acid:   Recent Labs   Lab 07/17/23  1122 07/17/23  1328   LACTATE 1.4 0.9     Magnesium:   Recent Labs   Lab 07/17/23  1122   MG 1.9     Urine Studies:   Recent Labs   Lab 07/17/23  1147   COLORU Yellow   APPEARANCEUA Clear   PHUR 7.0   SPECGRAV 1.010   PROTEINUA Trace*   GLUCUA Negative   KETONESU 2+*   BILIRUBINUA Negative   OCCULTUA 1+*   NITRITE Negative   UROBILINOGEN Negative   LEUKOCYTESUR Negative   RBCUA 11*   WBCUA 1   BACTERIA Negative   SQUAMEPITHEL 2   HYALINECASTS 1       Significant Imaging: I have reviewed all pertinent imaging results/findings within the past 24 hours.      12 LEAD EKG 07/17/2023 1117  Vent. Rate : 088 BPM     Atrial Rate : 088 BPM      P-R Int : 138 ms          QRS Dur : 086 ms       QT Int : 340 ms       P-R-T Axes : 052 078 045 degrees      QTc Int : 411 ms     Normal sinus rhythm   Normal ECG   When compared with ECG of 02-AUG-2022 21:11,   No significant change was found     X-Ray Chest AP Portable [[id number]]Collected: 07/17/23 1133     FINDINGS: Portable chest radiograph at 1142 hours compared to 07/13/2023 shows the cardiomediastinal silhouette and pulmonary vasculature are within normal limits.     The lungs are normally expanded, with no consolidation, large pleural effusion, or evidence of pulmonary edema. No confluent infiltrates or pneumothorax. There are no significant osseous abnormalities.     IMPRESSION: No evidence of active cardiopulmonary disease.     CT Soft Tissue Neck With Contrast [[id number]]Collected: 07/17/23 1352 d middle ear cavities appear unremarkable.     There is a small amount of fluid observed posteriorly within the nasopharynx and oropharynx associated with prominent mucosal thickening. This appearance could represent pharyngitis. There is some enhancing nodular soft tissue densities observed within the retropharyngeal soft tissues bilaterally which  likely represent mildly prominent lymph nodes. At least one of these to the right of midline at the C1 level demonstrates a nonenhancing center which may indicate central necrosis. There are additional mildly enlarged upper anterior cervical chain lymph nodes bilaterally measuring up to approximately 15 mm in short axis dimension and there are mildly enlarged posterior cervical chain lymph nodes measuring up to about 11 mm in short axis dimension on the right.     There is mild prominence of the palatine and lingual tonsils which demonstrate heterogeneous enhancement but without discrete abscess formation. There are otherwise no fluid collections identified.     Small calcifications are observed within the adenoids. The remainder of the pharyngeal and laryngeal soft tissues appear unremarkable and the upper airway is patent. The epiglottis appears unremarkable. The major salivary glands demonstrate no abnormalities. The major vascular structures enhance appropriately. The infrahyoid portion of the neck including the thyroid gland appears unremarkable. The lung apices are appropriately aerated.     IMPRESSION:     Small amount of fluid within the posterior nasal/oropharynx associated with mucosal enhancement. This appearance may represent pharyngitis. There are enhancing nodular soft tissue densities within the retropharyngeal soft tissues bilaterally which likely represent mildly prominent lymph nodes. A small node located towards the right of midline appears to contain central cystic degeneration/necrosis.     Cervical lymphadenopathy, potentially reactive in etiology. Clinical follow-up is indicated.     Mild prominence of the pharyngeal and lingual tonsils demonstrating heterogeneous enhancement.       Assessment/Plan:     * Fever of unknown origin  Infectious disease consult for evaluation by ED physician, she was given 1 dose of Zosyn in the ED, vital signs q.4 hours      Hyperglycemia  Glucose 162 in the ED,  obtain hemoglobin A1c, CMP daily      Hyponatremia  Sodium 131, given lactated Ringer's 1500 cc in ED, continue normal saline 100 cc an hour        Hypokalemia  Potassium 3.1, replace per protocol        VTE Risk Mitigation (From admission, onward)           Ordered     IP VTE LOW RISK PATIENT  Once         07/17/23 1739     Place sequential compression device  Until discontinued         07/17/23 1739                  Patient was examined at 1640      Code Status: FULL CODE        On 07/17/2023, patient should be placed in hospital observation services under my care in collaboration with MD Xuan Montana, NP  Department of Hospital Medicine  [facility] - Emergency Dept

## 2023-07-18 PROBLEM — E87.6 HYPOKALEMIA: Status: RESOLVED | Noted: 2023-07-17 | Resolved: 2023-07-18

## 2023-07-18 PROBLEM — E87.1 HYPONATREMIA: Status: RESOLVED | Noted: 2023-07-17 | Resolved: 2023-07-18

## 2023-07-18 PROBLEM — Z87.09 HISTORY OF PERITONSILLAR ABSCESS: Status: ACTIVE | Noted: 2022-09-20

## 2023-07-18 LAB
ALBUMIN SERPL BCP-MCNC: 2.9 G/DL (ref 3.5–5.2)
ALP SERPL-CCNC: 40 U/L (ref 55–135)
ALT SERPL W/O P-5'-P-CCNC: 16 U/L (ref 10–44)
ANION GAP SERPL CALC-SCNC: 4 MMOL/L (ref 8–16)
AST SERPL-CCNC: 16 U/L (ref 10–40)
BACTERIA BLD CULT: NORMAL
BACTERIA BLD CULT: NORMAL
BASOPHILS # BLD AUTO: 0.02 K/UL (ref 0–0.2)
BASOPHILS # BLD AUTO: 0.03 K/UL (ref 0–0.2)
BASOPHILS NFR BLD: 0.3 % (ref 0–1.9)
BASOPHILS NFR BLD: 0.4 % (ref 0–1.9)
BILIRUB SERPL-MCNC: 0.6 MG/DL (ref 0.1–1)
BUN SERPL-MCNC: <5 MG/DL (ref 6–20)
CALCIUM SERPL-MCNC: 8.6 MG/DL (ref 8.7–10.5)
CHLORIDE SERPL-SCNC: 104 MMOL/L (ref 95–110)
CO2 SERPL-SCNC: 31 MMOL/L (ref 23–29)
CREAT SERPL-MCNC: 0.5 MG/DL (ref 0.5–1.4)
DIFFERENTIAL METHOD: ABNORMAL
DIFFERENTIAL METHOD: ABNORMAL
EOSINOPHIL # BLD AUTO: 0.1 K/UL (ref 0–0.5)
EOSINOPHIL # BLD AUTO: 0.1 K/UL (ref 0–0.5)
EOSINOPHIL NFR BLD: 1.5 % (ref 0–8)
EOSINOPHIL NFR BLD: 1.5 % (ref 0–8)
ERYTHROCYTE [DISTWIDTH] IN BLOOD BY AUTOMATED COUNT: 13.2 % (ref 11.5–14.5)
ERYTHROCYTE [DISTWIDTH] IN BLOOD BY AUTOMATED COUNT: 13.2 % (ref 11.5–14.5)
EST. GFR  (NO RACE VARIABLE): >60 ML/MIN/1.73 M^2
ESTIMATED AVG GLUCOSE: 94 MG/DL (ref 68–131)
GLUCOSE SERPL-MCNC: 139 MG/DL (ref 70–110)
HBA1C MFR BLD: 4.9 % (ref 4.5–6.2)
HCT VFR BLD AUTO: 28.1 % (ref 37–48.5)
HCT VFR BLD AUTO: 29.4 % (ref 37–48.5)
HGB BLD-MCNC: 9.2 G/DL (ref 12–16)
HGB BLD-MCNC: 9.3 G/DL (ref 12–16)
IMM GRANULOCYTES # BLD AUTO: 0.05 K/UL (ref 0–0.04)
IMM GRANULOCYTES # BLD AUTO: 0.05 K/UL (ref 0–0.04)
IMM GRANULOCYTES NFR BLD AUTO: 0.7 % (ref 0–0.5)
IMM GRANULOCYTES NFR BLD AUTO: 0.8 % (ref 0–0.5)
LDH SERPL L TO P-CCNC: 97 U/L (ref 110–260)
LYMPHOCYTES # BLD AUTO: 1.6 K/UL (ref 1–4.8)
LYMPHOCYTES # BLD AUTO: 1.9 K/UL (ref 1–4.8)
LYMPHOCYTES NFR BLD: 25.4 % (ref 18–48)
LYMPHOCYTES NFR BLD: 27.5 % (ref 18–48)
MAGNESIUM SERPL-MCNC: 1.9 MG/DL (ref 1.6–2.6)
MCH RBC QN AUTO: 27.6 PG (ref 27–31)
MCH RBC QN AUTO: 28.4 PG (ref 27–31)
MCHC RBC AUTO-ENTMCNC: 31.6 G/DL (ref 32–36)
MCHC RBC AUTO-ENTMCNC: 32.7 G/DL (ref 32–36)
MCV RBC AUTO: 87 FL (ref 82–98)
MCV RBC AUTO: 87 FL (ref 82–98)
MONOCYTES # BLD AUTO: 0.7 K/UL (ref 0.3–1)
MONOCYTES # BLD AUTO: 0.8 K/UL (ref 0.3–1)
MONOCYTES NFR BLD: 10.7 % (ref 4–15)
MONOCYTES NFR BLD: 12.1 % (ref 4–15)
MRSA SCREEN BY PCR: NEGATIVE
NEUTROPHILS # BLD AUTO: 3.8 K/UL (ref 1.8–7.7)
NEUTROPHILS # BLD AUTO: 4 K/UL (ref 1.8–7.7)
NEUTROPHILS NFR BLD: 57.8 % (ref 38–73)
NEUTROPHILS NFR BLD: 61.3 % (ref 38–73)
NRBC BLD-RTO: 0 /100 WBC
NRBC BLD-RTO: 0 /100 WBC
PARASITE BLD: NORMAL
PATH REV BLD -IMP: NORMAL
PLATELET # BLD AUTO: 222 K/UL (ref 150–450)
PLATELET # BLD AUTO: 224 K/UL (ref 150–450)
PMV BLD AUTO: 10.4 FL (ref 9.2–12.9)
PMV BLD AUTO: 10.6 FL (ref 9.2–12.9)
POTASSIUM SERPL-SCNC: 4 MMOL/L (ref 3.5–5.1)
PROT SERPL-MCNC: 5.9 G/DL (ref 6–8.4)
RBC # BLD AUTO: 3.24 M/UL (ref 4–5.4)
RBC # BLD AUTO: 3.37 M/UL (ref 4–5.4)
SODIUM SERPL-SCNC: 139 MMOL/L (ref 136–145)
WBC # BLD AUTO: 6.18 K/UL (ref 3.9–12.7)
WBC # BLD AUTO: 6.84 K/UL (ref 3.9–12.7)
WNV IGG SPEC QL IA: NEGATIVE
WNV IGM CSF QL IA: NEGATIVE

## 2023-07-18 PROCEDURE — 36415 COLL VENOUS BLD VENIPUNCTURE: CPT | Performed by: STUDENT IN AN ORGANIZED HEALTH CARE EDUCATION/TRAINING PROGRAM

## 2023-07-18 PROCEDURE — 25000003 PHARM REV CODE 250: Performed by: STUDENT IN AN ORGANIZED HEALTH CARE EDUCATION/TRAINING PROGRAM

## 2023-07-18 PROCEDURE — 83735 ASSAY OF MAGNESIUM: CPT | Performed by: NURSE PRACTITIONER

## 2023-07-18 PROCEDURE — 99233 PR SUBSEQUENT HOSPITAL CARE,LEVL III: ICD-10-PCS | Mod: ,,, | Performed by: STUDENT IN AN ORGANIZED HEALTH CARE EDUCATION/TRAINING PROGRAM

## 2023-07-18 PROCEDURE — 94761 N-INVAS EAR/PLS OXIMETRY MLT: CPT

## 2023-07-18 PROCEDURE — 86038 ANTINUCLEAR ANTIBODIES: CPT | Performed by: NURSE PRACTITIONER

## 2023-07-18 PROCEDURE — 21400001 HC TELEMETRY ROOM

## 2023-07-18 PROCEDURE — 83036 HEMOGLOBIN GLYCOSYLATED A1C: CPT | Performed by: NURSE PRACTITIONER

## 2023-07-18 PROCEDURE — 83615 LACTATE (LD) (LDH) ENZYME: CPT | Performed by: NURSE PRACTITIONER

## 2023-07-18 PROCEDURE — 36415 COLL VENOUS BLD VENIPUNCTURE: CPT | Performed by: NURSE PRACTITIONER

## 2023-07-18 PROCEDURE — 63600175 PHARM REV CODE 636 W HCPCS: Performed by: STUDENT IN AN ORGANIZED HEALTH CARE EDUCATION/TRAINING PROGRAM

## 2023-07-18 PROCEDURE — 96366 THER/PROPH/DIAG IV INF ADDON: CPT

## 2023-07-18 PROCEDURE — 99233 SBSQ HOSP IP/OBS HIGH 50: CPT | Mod: ,,, | Performed by: STUDENT IN AN ORGANIZED HEALTH CARE EDUCATION/TRAINING PROGRAM

## 2023-07-18 PROCEDURE — 87070 CULTURE OTHR SPECIMN AEROBIC: CPT | Performed by: NURSE PRACTITIONER

## 2023-07-18 PROCEDURE — 87641 MR-STAPH DNA AMP PROBE: CPT | Performed by: STUDENT IN AN ORGANIZED HEALTH CARE EDUCATION/TRAINING PROGRAM

## 2023-07-18 PROCEDURE — 86645 CMV ANTIBODY IGM: CPT | Performed by: STUDENT IN AN ORGANIZED HEALTH CARE EDUCATION/TRAINING PROGRAM

## 2023-07-18 PROCEDURE — 96367 TX/PROPH/DG ADDL SEQ IV INF: CPT

## 2023-07-18 PROCEDURE — 85025 COMPLETE CBC W/AUTO DIFF WBC: CPT | Mod: 91 | Performed by: STUDENT IN AN ORGANIZED HEALTH CARE EDUCATION/TRAINING PROGRAM

## 2023-07-18 PROCEDURE — 80053 COMPREHEN METABOLIC PANEL: CPT | Performed by: NURSE PRACTITIONER

## 2023-07-18 PROCEDURE — 25000003 PHARM REV CODE 250: Performed by: NURSE PRACTITIONER

## 2023-07-18 PROCEDURE — 85025 COMPLETE CBC W/AUTO DIFF WBC: CPT | Performed by: NURSE PRACTITIONER

## 2023-07-18 RX ORDER — DIPHENHYDRAMINE HCL 25 MG
25 CAPSULE ORAL EVERY 6 HOURS PRN
Status: DISCONTINUED | OUTPATIENT
Start: 2023-07-18 | End: 2023-07-19 | Stop reason: HOSPADM

## 2023-07-18 RX ADMIN — AMPICILLIN AND SULBACTAM 3 G: 2; 1 INJECTION, POWDER, FOR SOLUTION INTRAVENOUS at 12:07

## 2023-07-18 RX ADMIN — OXYCODONE HYDROCHLORIDE 10 MG: 5 TABLET ORAL at 03:07

## 2023-07-18 RX ADMIN — DIPHENHYDRAMINE HYDROCHLORIDE 25 MG: 25 CAPSULE ORAL at 09:07

## 2023-07-18 RX ADMIN — AMPICILLIN AND SULBACTAM 3 G: 2; 1 INJECTION, POWDER, FOR SOLUTION INTRAVENOUS at 08:07

## 2023-07-18 RX ADMIN — IBUPROFEN 400 MG: 400 TABLET ORAL at 09:07

## 2023-07-18 RX ADMIN — AMPICILLIN AND SULBACTAM 3 G: 2; 1 INJECTION, POWDER, FOR SOLUTION INTRAVENOUS at 01:07

## 2023-07-18 RX ADMIN — OXYCODONE HYDROCHLORIDE 10 MG: 5 TABLET ORAL at 10:07

## 2023-07-18 RX ADMIN — OXYCODONE HYDROCHLORIDE 10 MG: 5 TABLET ORAL at 08:07

## 2023-07-18 RX ADMIN — VANCOMYCIN HYDROCHLORIDE 1250 MG: 1.25 INJECTION, POWDER, LYOPHILIZED, FOR SOLUTION INTRAVENOUS at 02:07

## 2023-07-18 RX ADMIN — VANCOMYCIN HYDROCHLORIDE 750 MG: 750 INJECTION, POWDER, LYOPHILIZED, FOR SOLUTION INTRAVENOUS at 02:07

## 2023-07-18 NOTE — PROGRESS NOTES
[facility] Hospital Medicine  Progress Note    Patient Name: Kade Moreno  MRN: [id number]  Patient Class: OP- Observation   Admission Date: 7/17/2023  Length of Stay: 0 days  Attending Physician: Eayd Bess MD  Primary Care Provider: Primary Doctor No        Subjective:     Principal Problem:Fever of unknown origin        HPI:  Kade Moreno is a 27-year-old female with no chronic medical problems who presents the emergency room complaining of fever.  Patient states that she has been having 103-106 fever for the last 7 days.  She said it always occurs when she is sleeping.  Her significant other wakes her up because she is restless and checked her temperature.  She said sometimes it happens during the day when she lays down to take a nap.  Fevers accompanied by frequent bad headaches, poor appetite and neck stiffness.  She said she had an episode where she felt weak and passed out 2 days ago.  She feels like she has a sore throat and has difficulty swallowing.  She had an abscess in her neck 2 years ago but it did not feel anything like this.  She has been having difficulty with a wisdom tooth and had pulled last Tuesday.  She said her symptoms started the Monday before that.  There are no other symptoms such as shortness of breath or cough, sinus congestion, dizziness, swelling is, dysuria, abdominal pain, nausea vomiting or diarrhea.  She was in the emergency room on the 13 and had a lumbar puncture.  She was given a full workup was fluid bolus and doses of vancomycin, ceftriaxone and acyclovir.  She was discharged home with doxycycline which she did not take.  So far all results coming back from lumbar puncture negative.  Lab work today with WBC 9.59, platelets 244, H&H 11.0/33.5, sodium 131, potassium 3.1, serum bicarb 9, BUN/CR 17/0.7, glucose 1462, ALT/AST 18/19, magnesium 1.9, procalcitonin 0.63, lactic acid 0.9.  Temperature 98.9°, heart rate 76, blood pressure 104/56,  "respiratory rate 18 O2 sat 100% on room air. CT soft tissue neck with cervical lymphadenopathy unclear etiology and mild prominence of pharyngeal and lingual tonsils with heterogeneous enhancement. ED physician spoke with Infectious Disease to come and see the patient.  She will be admitted to the hospitalist service for further evaluation and treatment with antibiotics per Infectious Disease.      Overview/Hospital Course:  Kade Moreno is a 27 year old female with a past medical history of recent wisdom tooth extraction who presented with multiple days of recurrent high grade fever while sleeping associated with headache, neck pain, sore throat and dysphagia. ID has been consulted. She is on empiric Unasyn and vancomycin. CT neck shows LAD and pharyngitis. Differential includes Kasi angina, Lemierre disease, mononucleosis (CMV, not EBV), and possible developing retropharyngeal abscess among other less likely possibilities such as malaria and opportunistic infections. Blood cultures are pending as well ultrasound of the upper extremities and neck veins. HIV testing and peripheral smear testing are pending.      Interval History: see "Hospital Course"    Review of Systems   Constitutional:  Positive for fever.   Objective:     Vital Signs (Most Recent):  Temp: 98.3 °F (36.8 °C) (07/18/23 0700)  Pulse: 78 (07/18/23 0700)  Resp: 18 (07/18/23 0700)  BP: (!) 110/57 (07/18/23 0700)  SpO2: 98 % (07/18/23 0805) Vital Signs (24h Range):  Temp:  [97.8 °F (36.6 °C)-101.5 °F (38.6 °C)] 98.3 °F (36.8 °C)  Pulse:  [] 78  Resp:  [15-19] 18  SpO2:  [97 %-100 %] 98 %  BP: ()/(54-68) 110/57     Weight: 50.3 kg (110 lb 12.5 oz)  Body mass index is 20.26 kg/m².    Intake/Output Summary (Last 24 hours) at 7/18/2023 0832  Last data filed at 7/18/2023 0517  Gross per 24 hour   Intake 2915.33 ml   Output 300 ml   Net 2615.33 ml         Physical Exam  Vitals and nursing note reviewed.   Constitutional:       General: She is " not in acute distress.  HENT:      Head: Normocephalic and atraumatic.      Right Ear: External ear normal.      Left Ear: External ear normal.      Nose: Nose normal.      Mouth/Throat:      Mouth: Mucous membranes are moist.      Pharynx: Oropharynx is clear.   Eyes:      Extraocular Movements: Extraocular movements intact.      Conjunctiva/sclera: Conjunctivae normal.   Cardiovascular:      Rate and Rhythm: Normal rate and regular rhythm.      Pulses: Normal pulses.      Heart sounds: Normal heart sounds.   Pulmonary:      Effort: Pulmonary effort is normal.      Breath sounds: Normal breath sounds.   Abdominal:      General: Bowel sounds are normal. There is no distension.      Palpations: Abdomen is soft.      Tenderness: There is no abdominal tenderness.   Musculoskeletal:         General: Normal range of motion.      Cervical back: No rigidity.   Lymphadenopathy:      Cervical: Cervical adenopathy present.   Skin:     General: Skin is warm and dry.   Neurological:      Mental Status: She is alert. Mental status is at baseline.   Psychiatric:         Mood and Affect: Mood normal.         Behavior: Behavior normal.           Significant Labs: All pertinent labs within the past 24 hours have been reviewed.    Significant Imaging: I have reviewed all pertinent imaging results/findings within the past 24 hours.      Assessment/Plan:      * Fever of unknown origin  -ID following  -Empiric Unasyn and vancomycin  -CMV PCR and Abs  -US IJs and UEs  -HIV testing  -DEANNA testing  -Follow up blood cultures  -Follow up peripheral smear      Hyperglycemia  In setting of fever and possible infection.  -Follow up Hgb A1c      VTE Risk Mitigation (From admission, onward)         Ordered     IP VTE LOW RISK PATIENT  Once         07/17/23 1739     Place sequential compression device  Until discontinued         07/17/23 1739                Discharge Planning   SHANTANU:     Code Status: Full Code   Is the patient medically ready for  discharge?:     Reason for patient still in hospital (select all that apply): Patient trending condition, Laboratory test, Treatment, Imaging and Consult recommendations                     Eyad Bess MD  Department of Hospital Medicine   Former[facility]

## 2023-07-18 NOTE — PROGRESS NOTES
Progress Note  Infectious Disease    Reason for Consult:  fever     HPI: Kade Moreno is a 27 y.o. female very pleasant lady, with no significant past medical history, presenting with persistent fever at home.  She came to the ER on 07/13 4th fever, headache, stiff neck for 3 days.  LP then negative for meningitis.  She reports she had her wisdom tooth removed about 2 months ago, she denies antibiotics being prescribed.  She and her  went to [place] for 4th of July, and symptoms started few days upon return; neck stiffness, fever as high as 106 at home, and headache.  She also reports some sore throat, decreased appetite.  Denies nausea or vomit, no chest pain or cough, no shortness of breath, no abdominal pain, no dysuria increased urinary frequency, no change in bowel movements.  She mentioned she had 1 of her wisdom teeth removed a week before coming to the ER on 7/13, she was not prescribed antibiotics.  From last ER visit on 07/13 she was discharged on doxycycline which she did not  because she was told was not infectious.    Labs with white count of 9.5, left shift 79.8%, H&H 11/33.5, platelet count 244  Hypokalemia 3.1, normal LFTs, normal kidney function   Procalcitonin 0.63, positive   Group a strep flu a, B, COVID negative   UA with hematuria 11, no pyuria, negative for bacteria.    Chest x-ray clear  CT neck with contrast with small amount of fluid within the posterior nasal oropharynx associated with mucosal enhancement.  May represent pharyngitis.  There are enhancing nodular soft tissue densities within the retropharyngeal soft tissue bilaterally which likely represent mildly prominent lymph nodes.  Cervical lymphadenopathy, potentially reactive in etiology.    ID contacted from the ER, discussed with ER attending, will admit for IV antibiotics and further workup.      ID consult for fever.    7/18:  Interim reviewed, patient seen examined at bedside, T-max 101.5° per night,  currently afebrile. She remembers her last tooth extraction was Tuesday of last week and that she had fever and pain before going to the dentist. She mentions it was impacted tooth, no antibiotics sent on discharge.  She reports her pain is a little worse on her left neck, unable to fully swallow food. Labs reviewed, stable WBC,no left shift. Micro reviewed, so far no growth. MRSA nasal negative.    Review of patient's allergies indicates:  No Known Allergies  History reviewed. No pertinent past medical history.  Past Surgical History:   Procedure Laterality Date    neck abscess       Social History     Tobacco Use    Smoking status: Former     Types: Cigarettes    Smokeless tobacco: Never    Tobacco comments:     Just quit smoking   Substance Use Topics    Alcohol use: Yes     Alcohol/week: 5.0 standard drinks     Types: 4 Glasses of wine, 1 Shots of liquor per week     Comment: socially        Family History   Problem Relation Age of Onset    Hypertension Mother     Cancer Maternal Grandmother         breast    Hypertension Maternal Grandfather        Pertinent medications noted:     Review of Systems:   +chills, fever, sweats, weight loss  No change in vision, loss of vision or diplopia  No sinus congestion, purulent nasal discharge, post nasal drip or facial pain  Sore throat, neck pain  No chest pain, palpitations, syncope  No cough, sputum production, shortness of breath, dyspnea on exertion, pleurisy, hemoptysis  No dysphagia, odynophagia  No nausea, vomiting, diarrhea, constipation, blood in stool, or focal abd pain,    No dysuria, hesitancy, hematuria, retention, incontinence  No swelling of joints, redness of joints, injuries, or new focal pain  No unusual headaches, dizziness, vertigo, numbness, paresthesias, neuropathy, falls  No anxiety, depression, substance abuse, sleep disturbance  No bleeding, lymphadenopathy  No new rashes, lesions, or wounds    Outdoor activities:  Originally from [place],  moved to the [place] in 2019, 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  Just moved to [place] with her .  They just came back from [place] for 4th of July weekend.  Nonsmoker, occasional alcohol, no drugs.  Travel: None  Implants: None  Antibiotic History: Zosyn in the ER     EXAM & DIAGNOSTICS REVIEWED:   Vitals:     Temp:  [97.8 °F (36.6 °C)-101.5 °F (38.6 °C)]   Temp: 98.2 °F (36.8 °C) (07/18/23 1100)  Pulse: 73 (07/18/23 1100)  Resp: 17 (07/18/23 1100)  BP: 111/64 (07/18/23 1100)  SpO2: 99 % (07/18/23 1100)    Intake/Output Summary (Last 24 hours) at 7/18/2023 1459  Last data filed at 7/18/2023 0517  Gross per 24 hour   Intake 1418.33 ml   Output 300 ml   Net 1118.33 ml       General:  In NAD. Alert and attentive, cooperative, uncomfortable with neck pain, on room air  Eyes:  Anicteric, PERRL  ENT:  Oropharynx with erythema, hypertrophied tonsils, no exudates noted  No ulcers, exudates, thrush, nares patent, dentition is good, s/p tooth extraction, no evidence of purulence  Neck:  Edema decreased compared to yesterday, b/l cervical mobile lymphadenopathy tender to palpation, supple  Lungs: Clear to auscultation b/l  Heart:  S1/S2+, regular rhythm, no murmurs  Abd:  +BS, soft, non tender, non distended, no rebound  :  Voids, no flank tenderness  Musc:  Joints without effusion, swelling,  erythema, synovitis, ambulatory  Skin:  Warm, no rash  Wound:   Neuro:  Following commands, speech is clear, moves all extremities, no acute focal deficit   Psych:  Calm, cooperative  Lymphatic:     As above   Extrem: No LE edema b/l  VAD:  Peripheral IV       Isolation: None      General Labs reviewed:  Recent Labs   Lab 07/17/23  1122 07/18/23  0350 07/18/23  0841   WBC 9.59 6.84 6.18   HGB 11.0* 9.3* 9.2*   HCT 33.5* 29.4* 28.1*    222 224       Recent Labs   Lab 07/13/23  0514 07/17/23  1122 07/18/23  0350   * 131* 139   K 3.4* 3.1* 4.0   CL 99 100 104   CO2 23 29 31*   BUN 8 7 <5*   CREATININE  0.7 0.7 0.5   CALCIUM 8.7 8.6* 8.6*   PROT 7.2 7.1 5.9*   BILITOT 1.0 0.6 0.6   ALKPHOS 40* 50* 40*   ALT 12 18 16   AST 15 19 16     Recent Labs   Lab 07/17/23  1122   CRP 30.91*     No results for input(s): SEDRATE in the last 168 hours.    Estimated Creatinine Clearance: 133.7 mL/min (based on SCr of 0.5 mg/dL).     Prior Micro:  Heme Aliquot mL 6.0    Appearance, CSF Clear Clear    Color, CSF Colorless Colorless    WBC, CSF 0 - 5 /cu mm 1    RBC, CSF 0 /cu mm 2 Abnormal     Lymphs, CSF 40 - 80 % 100 High    Glucose 60, protein 25, normal     Micro:  Microbiology Results (last 7 days)       Procedure Component Value Units Date/Time    Blood culture x two cultures. Draw prior to antibiotics. [[id number]] Collected: 07/17/23 1307    Order Status: Completed Specimen: Blood Updated: 07/18/23 1432     Blood Culture, Routine No Growth to date      No Growth to date    Narrative:      Aerobic and anaerobic  Collection has been rescheduled by [facility] at 07/17/2023 11:23 Reason:   Patient not in room  Collection has been rescheduled by [facility] at 07/17/2023 11:23 Reason:   Patient not in room    Blood culture x two cultures. Draw prior to antibiotics. [[id number]] Collected: 07/17/23 1307    Order Status: Completed Specimen: Blood Updated: 07/18/23 1432     Blood Culture, Routine No Growth to date      No Growth to date    Narrative:      Aerobic and anaerobic  Collection has been rescheduled by [facility] at 07/17/2023 11:23 Reason:   Patient not in room  Collection has been rescheduled by [facility] at 07/17/2023 11:23 Reason:   Patient not in room    MRSA Screen by PCR [[id number]] Collected: 07/18/23 0400    Order Status: Completed Specimen: Nasopharyngeal Swab from Nasal Updated: 07/18/23 0606     MRSA SCREEN BY PCR Negative    Throat culture [[id number]] Collected: 07/18/23 0400    Order Status: Sent Specimen: Throat Updated: 07/18/23 0410    Group A Strep, Molecular [[id number]] Collected: 07/17/23 1320    Order Status: Completed  Specimen: Throat Updated: 07/17/23 1438     Group A Strep, Molecular Negative     Comment: Arcanobacterium haemolyticum and Beta Streptococcus group C   and G will not be detected by this test method.  Please order   Throat Culture ([id number]) if suspected.         Strep A culture, throat [[id number]] Collected: 07/17/23 1428    Order Status: Canceled Specimen: Throat             Imaging Reviewed:  CXR   CT neck w/ IV contrast:  Small amount of fluid within the posterior nasal/oropharynx associated with mucosal enhancement. This appearance may represent pharyngitis. There are enhancing nodular soft tissue densities within the retropharyngeal soft tissues bilaterally which likely represent mildly prominent lymph nodes. A small node located towards the right of midline appears to contain central cystic degeneration/necrosis.   Cervical lymphadenopathy, potentially reactive in etiology. Clinical follow-up is indicated.   Mild prominence of the pharyngeal and lingual tonsils demonstrating heterogeneous enhancement.     LE venous US:  No evidence of venous thrombosis involving the bilateral upper extremity venous system.     Cardiology: EKG: Qtc: 411ms       IMPRESSION & PLAN     Sepsis, rule out retropharyngeal abscess, recent tooth extraction   Procal 0.63, positive   Group A Strep x 2 negative  Blood cultures x 2 in process  MRSA nasal swab negative  LDH normal     Recommendations:  Follow cultures including throat culture   Continue Unasyn 3g IV q6h   Vancomycin IV, keep level 15-20  CRP and procal ordered with AM labs  Kerby drops   Monitor temp curve    D/w patient,  at bedside, nursing, Dr Bess    Medical Decision Making during this encounter was  [_] Low Complexity  [_] Moderate Complexity  [xx] High Complexity

## 2023-07-18 NOTE — HOSPITAL COURSE
Kade Moreno is a 27 year old female with a past medical history of recent wisdom tooth extraction who presented with multiple days of recurrent high grade fever while sleeping associated with headache, neck pain, sore throat and dysphagia. ID has been consulted. She is on empiric Unasyn and vancomycin. CT neck shows LAD and pharyngitis. Differential includes Kasi angina, Lemierre disease, mononucleosis (CMV, ruled out EBV), and possible developing retropharyngeal abscess. Blood cultures are pending as well ultrasound of the upper extremities and neck veins. HIV testing is pending. She has defervesced over the last 24 hours while on antibiotics and procalcitonin and CRP are improving. She was discharged 7/19/2023 with a two week course of Augmentin and doxycycline. She will follow up with ID in two weeks with a repeat CT scan of the neck.

## 2023-07-18 NOTE — PLAN OF CARE
Patient transfer from ED to room 3005 on stretcher with transporter. Initial assessment completed and documented. Ambulate with standby assist. Tele in place. Condition stable. Family at bedside. No acute distress noted at this time.

## 2023-07-18 NOTE — CARE UPDATE
07/17/23 2415   Patient Assessment/Suction   Level of Consciousness (AVPU) alert   Respiratory Effort Normal;Unlabored   PRE-TX-O2   Device (Oxygen Therapy) room air   SpO2 100 %   Pulse Oximetry Type Intermittent   $ Pulse Oximetry - Multiple Charge Pulse Oximetry - Multiple   Pulse 74   Resp 18   Respiratory Evaluation   $ Care Plan Tech Time 15 min

## 2023-07-18 NOTE — PROGRESS NOTES
VANCOMYCIN PHARMACOKINETIC NOTE:  Vancomycin Day # 1    Objective/Assessment:    Diagnosis/Indication for Vancomycin:Sepsis      27 y.o., female; Actual Body Weight = 50.3 kg (110 lb 12.5 oz).    The patient has the following labs:  7/18/2023 Estimated Creatinine Clearance: 95.5 mL/min (based on SCr of 0.7 mg/dL). Lab Results   Component Value Date    BUN 7 07/17/2023     Lab Results   Component Value Date    WBC 9.59 07/17/2023          Plan:  Adjust vancomycin dose and/or frequency based on the patient's actual weight and renal function:  Initiate Vancomycin 750 mg IV every 12 hours following 1250 mg loading dose.  Orders have been entered into patient's chart.        Vancomycin trough level has been ordered for 7/19 at 13:00.    Pharmacy will manage vancomycin therapy, monitor serum vancomycin levels, monitor renal function and adjust regimen as necessary.    Thank you for allowing us to participate in this patient's care.     Mecca Brock 7/18/2023   Department of Pharmacy  Ext [phone]

## 2023-07-18 NOTE — ASSESSMENT & PLAN NOTE
-ID following  -Empiric Unasyn and vancomycin  -CMV PCR and Abs  -US IJs and UEs  -HIV testing  -DEANNA testing  -Follow up blood cultures  -Follow up peripheral smear

## 2023-07-18 NOTE — SUBJECTIVE & OBJECTIVE
"Interval History: see "Hospital Course"    Review of Systems   Constitutional:  Positive for fever.   Objective:     Vital Signs (Most Recent):  Temp: 98.3 °F (36.8 °C) (07/18/23 0700)  Pulse: 78 (07/18/23 0700)  Resp: 18 (07/18/23 0700)  BP: (!) 110/57 (07/18/23 0700)  SpO2: 98 % (07/18/23 0805) Vital Signs (24h Range):  Temp:  [97.8 °F (36.6 °C)-101.5 °F (38.6 °C)] 98.3 °F (36.8 °C)  Pulse:  [] 78  Resp:  [15-19] 18  SpO2:  [97 %-100 %] 98 %  BP: ()/(54-68) 110/57     Weight: 50.3 kg (110 lb 12.5 oz)  Body mass index is 20.26 kg/m².    Intake/Output Summary (Last 24 hours) at 7/18/2023 0832  Last data filed at 7/18/2023 0517  Gross per 24 hour   Intake 2915.33 ml   Output 300 ml   Net 2615.33 ml         Physical Exam  Vitals and nursing note reviewed.   Constitutional:       General: She is not in acute distress.  HENT:      Head: Normocephalic and atraumatic.      Right Ear: External ear normal.      Left Ear: External ear normal.      Nose: Nose normal.      Mouth/Throat:      Mouth: Mucous membranes are moist.      Pharynx: Oropharynx is clear.   Eyes:      Extraocular Movements: Extraocular movements intact.      Conjunctiva/sclera: Conjunctivae normal.   Cardiovascular:      Rate and Rhythm: Normal rate and regular rhythm.      Pulses: Normal pulses.      Heart sounds: Normal heart sounds.   Pulmonary:      Effort: Pulmonary effort is normal.      Breath sounds: Normal breath sounds.   Abdominal:      General: Bowel sounds are normal. There is no distension.      Palpations: Abdomen is soft.      Tenderness: There is no abdominal tenderness.   Musculoskeletal:         General: Normal range of motion.      Cervical back: No rigidity.   Lymphadenopathy:      Cervical: Cervical adenopathy present.   Skin:     General: Skin is warm and dry.   Neurological:      Mental Status: She is alert. Mental status is at baseline.   Psychiatric:         Mood and Affect: Mood normal.         Behavior: Behavior " normal.           Significant Labs: All pertinent labs within the past 24 hours have been reviewed.    Significant Imaging: I have reviewed all pertinent imaging results/findings within the past 24 hours.

## 2023-07-18 NOTE — PLAN OF CARE
Critical access hospital  Initial Discharge Assessment       Primary Care Provider: Primary Doctor No    Admission Diagnosis: Fever, unknown origin [R50.9]  Neck pain [M54.2]    Admission Date: 7/17/2023  Expected Discharge Date: 7/19/2023    Transition of Care Barriers: None    Payor: /     Extended Emergency Contact Information  Primary Emergency Contact: Gerald Avina  Mobile Phone: [phone]  Relation: Significant other  Preferred language: English   needed? No    Discharge Plan A: Home  Discharge Plan B: Home with family      [id number] [place] - [address]  [address] [facility]  [place] [address]  Phone: [phone] Fax: [phone]    [id number] - [place] - [address]  [address] & [facility]  [address] [facility] [address]  Phone: [phone] Fax: [phone]    SW met with patient and patient's spouse Gerald Avina at bedside to complete discharge planning assessment.  Patient alert and oriented xs 4.  Patient verified all demographic information on facesheet is correct.  Patient verified she has NO PCP at this time.  Patient verified she has NO primary health insurance at this time .  Patient with NO home health or DME.  Patient with NO POA or Living Will.  Patient not on dialysis or medication coumadin.  Patient with no 30 day admission.  Patient with no financial issues at this time.  Patient family will provide transportation upon discharge from facility.  Patient independent with ADLs, live with spouse, drives self.      Initial Assessment (most recent)       Adult Discharge Assessment - 07/18/23 1244          Discharge Assessment    Assessment Type Discharge Planning Assessment     Confirmed/corrected address, phone number and insurance Yes     Confirmed Demographics Correct on Facesheet     Source of Information patient;family     Does patient/caregiver understand observation status Yes     Communicated SHANTANU with  patient/caregiver Yes     People in Home spouse     Facility Arrived From: home     Do you expect to return to your current living situation? Yes     Do you have help at home or someone to help you manage your care at home? Yes     Who are your caregiver(s) and their phone number(s)? self     Prior to hospitilization cognitive status: Alert/Oriented     Current cognitive status: Alert/Oriented     Equipment Currently Used at Home none     Readmission within 30 days? No     Patient currently being followed by outpatient case management? No     Do you currently have service(s) that help you manage your care at home? No     Do you take prescription medications? Yes     Do you have prescription coverage? Yes     Do you have any problems affording any of your prescribed medications? No     Is the patient taking medications as prescribed? yes     Who is going to help you get home at discharge? self     How do you get to doctors appointments? car, drives self     Are you on dialysis? No     Do you take coumadin? No     Discharge Plan A Home     Discharge Plan B Home with family     DME Needed Upon Discharge  none     Discharge Plan discussed with: Patient     Transition of Care Barriers None        Physical Activity    On average, how many days per week do you engage in moderate to strenuous exercise (like a brisk walk)? 3 days     On average, how many minutes do you engage in exercise at this level? 60 min        Financial Resource Strain    How hard is it for you to pay for the very basics like food, housing, medical care, and heating? Not hard at all        Housing Stability    In the last 12 months, was there a time when you were not able to pay the mortgage or rent on time? No     In the last 12 months, was there a time when you did not have a steady place to sleep or slept in a shelter (including now)? No        Transportation Needs    In the past 12 months, has lack of transportation kept you from medical  appointments or from getting medications? No     In the past 12 months, has lack of transportation kept you from meetings, work, or from getting things needed for daily living? No        Food Insecurity    Within the past 12 months, you worried that your food would run out before you got the money to buy more. Never true     Within the past 12 months, the food you bought just didn't last and you didn't have money to get more. Never true        Stress    Do you feel stress - tense, restless, nervous, or anxious, or unable to sleep at night because your mind is troubled all the time - these days? Not at all        Social Connections    In a typical week, how many times do you talk on the phone with family, friends, or neighbors? Never     How often do you get together with friends or relatives? Never     How often do you attend Sikh or Jain services? Never     Do you belong to any clubs or organizations such as Sikh groups, unions, fraternal or athletic groups, or school groups? No     How often do you attend meetings of the clubs or organizations you belong to? Never     Are you , , , , never , or living with a partner?         Alcohol Use    Q1: How often do you have a drink containing alcohol? 2-4 times a month     Q2: How many drinks containing alcohol do you have on a typical day when you are drinking? 1 or 2     Q3: How often do you have six or more drinks on one occasion? Never

## 2023-07-19 VITALS
BODY MASS INDEX: 20.08 KG/M2 | HEIGHT: 62 IN | DIASTOLIC BLOOD PRESSURE: 66 MMHG | HEART RATE: 67 BPM | WEIGHT: 109.13 LBS | RESPIRATION RATE: 16 BRPM | OXYGEN SATURATION: 99 % | SYSTOLIC BLOOD PRESSURE: 107 MMHG | TEMPERATURE: 98 F

## 2023-07-19 PROBLEM — J02.8 ACUTE BACTERIAL PHARYNGITIS: Status: ACTIVE | Noted: 2023-07-17

## 2023-07-19 PROBLEM — B96.89 ACUTE BACTERIAL PHARYNGITIS: Status: ACTIVE | Noted: 2023-07-17

## 2023-07-19 PROBLEM — D64.9 ANEMIA: Status: ACTIVE | Noted: 2023-07-19

## 2023-07-19 LAB
ALBUMIN SERPL BCP-MCNC: 2.8 G/DL (ref 3.5–5.2)
ALP SERPL-CCNC: 41 U/L (ref 55–135)
ALT SERPL W/O P-5'-P-CCNC: 15 U/L (ref 10–44)
ANA TITR SER IF: NEGATIVE {TITER}
ANION GAP SERPL CALC-SCNC: 6 MMOL/L (ref 8–16)
AST SERPL-CCNC: 16 U/L (ref 10–40)
BASOPHILS # BLD AUTO: 0.03 K/UL (ref 0–0.2)
BASOPHILS NFR BLD: 0.4 % (ref 0–1.9)
BILIRUB SERPL-MCNC: 0.5 MG/DL (ref 0.1–1)
BUN SERPL-MCNC: <5 MG/DL (ref 6–20)
CALCIUM SERPL-MCNC: 8.3 MG/DL (ref 8.7–10.5)
CHLORIDE SERPL-SCNC: 101 MMOL/L (ref 95–110)
CMV IGG SERPL IA-ACNC: 3.7 U/ML (ref 0–0.59)
CMV IGM SERPL IA-ACNC: <30 AU/ML (ref 0–29.9)
CO2 SERPL-SCNC: 28 MMOL/L (ref 23–29)
CREAT SERPL-MCNC: 0.6 MG/DL (ref 0.5–1.4)
CRP SERPL-MCNC: 17.72 MG/DL
DIFFERENTIAL METHOD: ABNORMAL
EOSINOPHIL # BLD AUTO: 0.2 K/UL (ref 0–0.5)
EOSINOPHIL NFR BLD: 2.3 % (ref 0–8)
ERYTHROCYTE [DISTWIDTH] IN BLOOD BY AUTOMATED COUNT: 13.2 % (ref 11.5–14.5)
EST. GFR  (NO RACE VARIABLE): >60 ML/MIN/1.73 M^2
GLUCOSE SERPL-MCNC: 147 MG/DL (ref 70–110)
HCT VFR BLD AUTO: 28.4 % (ref 37–48.5)
HGB BLD-MCNC: 9.1 G/DL (ref 12–16)
HIV 1+2 AB+HIV1 P24 AG SERPL QL IA: NON REACTIVE
IMM GRANULOCYTES # BLD AUTO: 0.04 K/UL (ref 0–0.04)
IMM GRANULOCYTES NFR BLD AUTO: 0.6 % (ref 0–0.5)
LYMPHOCYTES # BLD AUTO: 2 K/UL (ref 1–4.8)
LYMPHOCYTES NFR BLD: 29 % (ref 18–48)
MAGNESIUM SERPL-MCNC: 1.8 MG/DL (ref 1.6–2.6)
MCH RBC QN AUTO: 27.8 PG (ref 27–31)
MCHC RBC AUTO-ENTMCNC: 32 G/DL (ref 32–36)
MCV RBC AUTO: 87 FL (ref 82–98)
MONOCYTES # BLD AUTO: 0.6 K/UL (ref 0.3–1)
MONOCYTES NFR BLD: 8.3 % (ref 4–15)
NEUTROPHILS # BLD AUTO: 4.2 K/UL (ref 1.8–7.7)
NEUTROPHILS NFR BLD: 59.4 % (ref 38–73)
NRBC BLD-RTO: 0 /100 WBC
PLATELET # BLD AUTO: 274 K/UL (ref 150–450)
PMV BLD AUTO: 10.5 FL (ref 9.2–12.9)
POTASSIUM SERPL-SCNC: 3.6 MMOL/L (ref 3.5–5.1)
PROCALCITONIN SERPL IA-MCNC: 0.23 NG/ML (ref 0–0.5)
PROT SERPL-MCNC: 6.1 G/DL (ref 6–8.4)
RBC # BLD AUTO: 3.27 M/UL (ref 4–5.4)
SODIUM SERPL-SCNC: 135 MMOL/L (ref 136–145)
VANCOMYCIN TROUGH SERPL-MCNC: 7.4 UG/ML
WBC # BLD AUTO: 7.01 K/UL (ref 3.9–12.7)

## 2023-07-19 PROCEDURE — 85025 COMPLETE CBC W/AUTO DIFF WBC: CPT | Performed by: NURSE PRACTITIONER

## 2023-07-19 PROCEDURE — 84145 PROCALCITONIN (PCT): CPT | Performed by: STUDENT IN AN ORGANIZED HEALTH CARE EDUCATION/TRAINING PROGRAM

## 2023-07-19 PROCEDURE — 86140 C-REACTIVE PROTEIN: CPT | Performed by: STUDENT IN AN ORGANIZED HEALTH CARE EDUCATION/TRAINING PROGRAM

## 2023-07-19 PROCEDURE — 25000003 PHARM REV CODE 250: Performed by: STUDENT IN AN ORGANIZED HEALTH CARE EDUCATION/TRAINING PROGRAM

## 2023-07-19 PROCEDURE — 94761 N-INVAS EAR/PLS OXIMETRY MLT: CPT

## 2023-07-19 PROCEDURE — 25000003 PHARM REV CODE 250: Performed by: NURSE PRACTITIONER

## 2023-07-19 PROCEDURE — 80202 ASSAY OF VANCOMYCIN: CPT | Performed by: STUDENT IN AN ORGANIZED HEALTH CARE EDUCATION/TRAINING PROGRAM

## 2023-07-19 PROCEDURE — 63600175 PHARM REV CODE 636 W HCPCS: Performed by: NURSE PRACTITIONER

## 2023-07-19 PROCEDURE — 99233 PR SUBSEQUENT HOSPITAL CARE,LEVL III: ICD-10-PCS | Mod: ,,, | Performed by: STUDENT IN AN ORGANIZED HEALTH CARE EDUCATION/TRAINING PROGRAM

## 2023-07-19 PROCEDURE — 36415 COLL VENOUS BLD VENIPUNCTURE: CPT | Performed by: STUDENT IN AN ORGANIZED HEALTH CARE EDUCATION/TRAINING PROGRAM

## 2023-07-19 PROCEDURE — 80053 COMPREHEN METABOLIC PANEL: CPT | Performed by: NURSE PRACTITIONER

## 2023-07-19 PROCEDURE — 99233 SBSQ HOSP IP/OBS HIGH 50: CPT | Mod: ,,, | Performed by: STUDENT IN AN ORGANIZED HEALTH CARE EDUCATION/TRAINING PROGRAM

## 2023-07-19 PROCEDURE — 63600175 PHARM REV CODE 636 W HCPCS: Performed by: STUDENT IN AN ORGANIZED HEALTH CARE EDUCATION/TRAINING PROGRAM

## 2023-07-19 PROCEDURE — 83735 ASSAY OF MAGNESIUM: CPT | Performed by: NURSE PRACTITIONER

## 2023-07-19 RX ORDER — DOXYCYCLINE 100 MG/1
100 CAPSULE ORAL 2 TIMES DAILY
Qty: 28 CAPSULE | Refills: 0 | Status: SHIPPED | OUTPATIENT
Start: 2023-07-19 | End: 2023-07-19 | Stop reason: SDUPTHER

## 2023-07-19 RX ORDER — AMOXICILLIN AND CLAVULANATE POTASSIUM 875; 125 MG/1; MG/1
1 TABLET, FILM COATED ORAL EVERY 12 HOURS
Qty: 28 TABLET | Refills: 0 | Status: SHIPPED | OUTPATIENT
Start: 2023-07-19 | End: 2023-07-19 | Stop reason: SDUPTHER

## 2023-07-19 RX ORDER — AMOXICILLIN AND CLAVULANATE POTASSIUM 875; 125 MG/1; MG/1
1 TABLET, FILM COATED ORAL EVERY 12 HOURS
Qty: 28 TABLET | Refills: 0 | Status: SHIPPED | OUTPATIENT
Start: 2023-07-19 | End: 2023-08-02

## 2023-07-19 RX ORDER — DOXYCYCLINE 100 MG/1
100 CAPSULE ORAL 2 TIMES DAILY
Qty: 28 CAPSULE | Refills: 0 | Status: SHIPPED | OUTPATIENT
Start: 2023-07-19 | End: 2023-08-02

## 2023-07-19 RX ADMIN — ONDANSETRON 4 MG: 2 INJECTION INTRAMUSCULAR; INTRAVENOUS at 01:07

## 2023-07-19 RX ADMIN — AMPICILLIN AND SULBACTAM 3 G: 2; 1 INJECTION, POWDER, FOR SOLUTION INTRAVENOUS at 01:07

## 2023-07-19 RX ADMIN — AMPICILLIN AND SULBACTAM 3 G: 2; 1 INJECTION, POWDER, FOR SOLUTION INTRAVENOUS at 07:07

## 2023-07-19 RX ADMIN — OXYCODONE HYDROCHLORIDE 10 MG: 5 TABLET ORAL at 02:07

## 2023-07-19 RX ADMIN — IBUPROFEN 400 MG: 400 TABLET ORAL at 01:07

## 2023-07-19 RX ADMIN — OXYCODONE HYDROCHLORIDE 10 MG: 5 TABLET ORAL at 11:07

## 2023-07-19 RX ADMIN — VANCOMYCIN HYDROCHLORIDE 750 MG: 750 INJECTION, POWDER, LYOPHILIZED, FOR SOLUTION INTRAVENOUS at 02:07

## 2023-07-19 NOTE — PLAN OF CARE
Patient remains injury free. Tolerating Regular diet and PO well. Ambulate independently. PO pain medication administered Q 6 hrs. Patient did not have a temp on this unit. Condition stable.

## 2023-07-19 NOTE — ASSESSMENT & PLAN NOTE
Improved to resolved. Thought now to be secondary to impacted recently removed wisdom tooth.  -ID following; follow up in clinic with repeat CT scan  -Doxycycline and Augmentin for two weeks  -CMV PCR and Abs  -HIV testing  -DEANNA testing  -Follow up blood cultures; negative thus far

## 2023-07-19 NOTE — NURSING
Pt verbalized understanding of discharge instructions. Rx brought up by [facility]. IV and tele box removed, box returned to cardio B. Pt ambulated down with spouse to private vehicle.

## 2023-07-19 NOTE — SUBJECTIVE & OBJECTIVE
"Interval History: see "Hospital Course"    Review of Systems   Constitutional:  Positive for fatigue. Negative for fever.   Objective:     Vital Signs (Most Recent):  Temp: 97.4 °F (36.3 °C) (07/19/23 0626)  Pulse: 61 (07/19/23 0626)  Resp: 15 (07/19/23 0626)  BP: (!) 98/59 (07/19/23 0626)  SpO2: 98 % (07/19/23 0626) Vital Signs (24h Range):  Temp:  [97.4 °F (36.3 °C)-99.6 °F (37.6 °C)] 97.4 °F (36.3 °C)  Pulse:  [61-76] 61  Resp:  [15-18] 15  SpO2:  [97 %-100 %] 98 %  BP: ()/(54-65) 98/59     Weight: 49.5 kg (109 lb 2 oz)  Body mass index is 19.96 kg/m².    Intake/Output Summary (Last 24 hours) at 7/19/2023 0730  Last data filed at 7/19/2023 0234  Gross per 24 hour   Intake 360 ml   Output 800 ml   Net -440 ml         Physical Exam  Vitals and nursing note reviewed.   Constitutional:       General: She is not in acute distress.  HENT:      Head: Normocephalic and atraumatic.      Right Ear: External ear normal.      Left Ear: External ear normal.      Nose: Nose normal.      Mouth/Throat:      Mouth: Mucous membranes are moist.      Pharynx: Oropharynx is clear. Posterior oropharyngeal erythema present. No oropharyngeal exudate.   Eyes:      Extraocular Movements: Extraocular movements intact.      Conjunctiva/sclera: Conjunctivae normal.   Cardiovascular:      Rate and Rhythm: Normal rate and regular rhythm.      Pulses: Normal pulses.      Heart sounds: Normal heart sounds.   Pulmonary:      Effort: Pulmonary effort is normal.      Breath sounds: Normal breath sounds.   Abdominal:      General: Bowel sounds are normal. There is no distension.      Palpations: Abdomen is soft.      Tenderness: There is no abdominal tenderness.   Musculoskeletal:         General: Normal range of motion.      Cervical back: No rigidity.   Lymphadenopathy:      Cervical: Cervical adenopathy present.   Skin:     General: Skin is warm and dry.   Neurological:      Mental Status: She is alert. Mental status is at baseline. "   Psychiatric:         Mood and Affect: Mood normal.         Behavior: Behavior normal.           Significant Labs: All pertinent labs within the past 24 hours have been reviewed.    Significant Imaging: I have reviewed all pertinent imaging results/findings within the past 24 hours.

## 2023-07-19 NOTE — PLAN OF CARE
Patient cleared for discharge from case management standpoint.    Patient to contact [facility] for 1 week hospital follow up.       07/19/23 1001   Final Note   Assessment Type Final Discharge Note   Anticipated Discharge Disposition Home   What phone number can be called within the next 1-3 days to see how you are doing after discharge? [phone]   Hospital Resources/Appts/Education Provided Appointments scheduled and added to AVS;Community resources provided;Provided patient/caregiver with written discharge plan information

## 2023-07-19 NOTE — CARE UPDATE
07/19/23 0839   Patient Assessment/Suction   Level of Consciousness (AVPU) alert   PRE-TX-O2   Device (Oxygen Therapy) room air   SpO2 99 %   Pulse Oximetry Type Intermittent   $ Pulse Oximetry - Multiple Charge Pulse Oximetry - Multiple   Pulse 64

## 2023-07-19 NOTE — DISCHARGE SUMMARY
Formerly [facility] Medicine  Discharge Summary      Patient Name: Kade Moreno  MRN: [id number]  [facility]: [id number]  Patient Class: IP- Inpatient  Admission Date: 7/17/2023  Hospital Length of Stay: 1 days  Discharge Date and Time: No discharge date for patient encounter.  Attending Physician: Eyad Bess MD   Discharging Provider: Eyad Bess MD  Primary Care Provider: Primary Doctor No    Primary Care Team: Networked reference to record PCT     HPI:   Kade Moreno is a 27-year-old female with no chronic medical problems who presents the emergency room complaining of fever.  Patient states that she has been having 103-106 fever for the last 7 days.  She said it always occurs when she is sleeping.  Her significant other wakes her up because she is restless and checked her temperature.  She said sometimes it happens during the day when she lays down to take a nap.  Fevers accompanied by frequent bad headaches, poor appetite and neck stiffness.  She said she had an episode where she felt weak and passed out 2 days ago.  She feels like she has a sore throat and has difficulty swallowing.  She had an abscess in her neck 2 years ago but it did not feel anything like this.  She has been having difficulty with a wisdom tooth and had pulled last Tuesday.  She said her symptoms started the Monday before that.  There are no other symptoms such as shortness of breath or cough, sinus congestion, dizziness, swelling is, dysuria, abdominal pain, nausea vomiting or diarrhea.  She was in the emergency room on the 13 and had a lumbar puncture.  She was given a full workup was fluid bolus and doses of vancomycin, ceftriaxone and acyclovir.  She was discharged home with doxycycline which she did not take.  So far all results coming back from lumbar puncture negative.  Lab work today with WBC 9.59, platelets 244, H&H 11.0/33.5, sodium 131, potassium 3.1, serum bicarb 9, BUN/CR 17/0.7, glucose 1462,  ALT/AST 18/19, magnesium 1.9, procalcitonin 0.63, lactic acid 0.9.  Temperature 98.9°, heart rate 76, blood pressure 104/56, respiratory rate 18 O2 sat 100% on room air. CT soft tissue neck with cervical lymphadenopathy unclear etiology and mild prominence of pharyngeal and lingual tonsils with heterogeneous enhancement. ED physician spoke with Infectious Disease to come and see the patient.  She will be admitted to the hospitalist service for further evaluation and treatment with antibiotics per Infectious Disease.      * No surgery found *      Hospital Course:   Kade Moreno is a 27 year old female with a past medical history of recent wisdom tooth extraction who presented with multiple days of recurrent high grade fever while sleeping associated with headache, neck pain, sore throat and dysphagia. ID has been consulted. She is on empiric Unasyn and vancomycin. CT neck shows LAD and pharyngitis. Differential includes Kasi angina, Lemierre disease, mononucleosis (CMV, ruled out EBV), and possible developing retropharyngeal abscess. Blood cultures are pending as well ultrasound of the upper extremities and neck veins. HIV testing is pending. She has defervesced over the last 24 hours while on antibiotics and procalcitonin and CRP are improving. She was discharged 7/19/2023 with a two week course of Augmentin and doxycycline. She will follow up with ID in two weeks with a repeat CT scan of the neck.       Goals of Care Treatment Preferences:  Code Status: Full Code      Consults:   Consults (From admission, onward)        Status Ordering Provider     Pharmacy to dose Vancomycin consult  Once        Provider:  (Not yet assigned)   See Hyperspace for full Linked Orders Report.    Acknowledged TYSON SALDANA     Inpatient consult to Infectious Diseases  Once        Provider:  Tyson Curtis MD    Completed BEHZAD VARELA          Renal/  Hyponatremia-resolved as of 7/18/2023  Sodium 131,  given lactated Ringer's 1500 cc in ED, continue normal saline 100 cc an hour        Hypokalemia-resolved as of 7/18/2023  Potassium 3.1, replace per protocol      Endocrine  Hyperglycemia  In setting of fever and possible infection. Hgb A1c unremarkable.    Other  * Acute bacterial pharyngitis  Improved to resolved. Thought now to be secondary to impacted recently removed wisdom tooth.  -ID following; follow up in clinic with repeat CT scan  -Doxycycline and Augmentin for two weeks  -CMV PCR and Abs  -HIV testing  -DEANNA testing  -Follow up blood cultures; negative thus far        Final Active Diagnoses:    Diagnosis Date Noted POA    PRINCIPAL PROBLEM:  Acute bacterial pharyngitis [J02.8, B96.89] 07/17/2023 Yes    Anemia [D64.9] 07/19/2023 Yes    Hyperglycemia [R73.9] 07/17/2023 Yes      Problems Resolved During this Admission:    Diagnosis Date Noted Date Resolved POA    Hypokalemia [E87.6] 07/17/2023 07/18/2023 Yes    Hyponatremia [E87.1] 07/17/2023 07/18/2023 Yes       Discharged Condition: stable    Disposition: Home or Self Care    Follow Up:   Follow-up Information     Nila Curtis MD Follow up in 2 week(s).    Specialty: Infectious Diseases  Contact information:  [address] [place] [address] [phone]                       Patient Instructions:      CT Soft Tissue Neck With Contrast   Standing Status: Future Standing Exp. Date: 07/19/24     Order Specific Question Answer Comments   Is the patient allergic to iodine contrast? No    Does this patient have impaired renal function? No    May the Radiologist modify the order per protocol to meet the clinical needs of the patient? Yes      Diet Adult Regular     Notify your health care provider if you experience any of the following:  increased confusion or weakness     Notify your health care provider if you experience any of the following:  persistent dizziness, light-headedness, or visual disturbances     Notify your health  care provider if you experience any of the following:  severe persistent headache     Notify your health care provider if you experience any of the following:  difficulty breathing or increased cough     Notify your health care provider if you experience any of the following:  severe uncontrolled pain     Notify your health care provider if you experience any of the following:  persistent nausea and vomiting or diarrhea     Notify your health care provider if you experience any of the following:  temperature >100.4     Notify your health care provider if you experience any of the following:  redness, tenderness, or signs of infection (pain, swelling, redness, odor or green/yellow discharge around incision site)     Activity as tolerated       Significant Diagnostic Studies: Labs: All labs within the past 24 hours have been reviewed    Pending Diagnostic Studies:     Procedure Component Value Units Date/Time    DEANNA Screen w/Reflex [[id number]] Collected: 07/18/23 0350    Order Status: Sent Lab Status: In process Updated: 07/18/23 0419    Specimen: Blood     Narrative:      Collection has been rescheduled by 2MB at 07/17/2023 20:29 Reason:   Draw with am labs per rn montrel    HIV 1/2 Ag/Ab (4th Gen) [[id number]] Collected: 07/17/23 1747    Order Status: Sent Lab Status: In process Updated: 07/17/23 1755    Specimen: Blood          Medications:  Reconciled Home Medications:      Medication List      START taking these medications    amoxicillin-clavulanate 875-125mg 875-125 mg per tablet  Commonly known as: AUGMENTIN  Take 1 tablet by mouth every 12 (twelve) hours. for 14 days        CHANGE how you take these medications    doxycycline 100 MG Cap  Commonly known as: VIBRAMYCIN  Take 1 capsule (100 mg total) by mouth 2 (two) times daily. for 14 days  What changed: additional instructions        CONTINUE taking these medications    HYDROcodone-acetaminophen 7.5-325 mg per tablet  Commonly known as: NORCO  Take 1 tablet by  mouth every 4 to 6 hours as needed.     ibuprofen 200 MG tablet  Commonly known as: ADVIL,MOTRIN  Take 400 mg by mouth every 6 (six) hours as needed for Pain.     TYLENOL EXTRA STRENGTH 500 MG tablet  Generic drug: acetaminophen  Take 1,000 mg by mouth every 6 (six) hours as needed for Pain.            Indwelling Lines/Drains at time of discharge:   Lines/Drains/Airways     None                 Time spent on the discharge of patient: 33 minutes         Eyad Bess MD  Department of Hospital Medicine  [facility]

## 2023-07-19 NOTE — PROGRESS NOTES
Progress Note  Infectious Disease    Reason for Consult:  fever     HPI: Kade Moreno is a 27 y.o. female very pleasant lady, with no significant past medical history, presenting with persistent fever at home.  She came to the ER on 07/13 4th fever, headache, stiff neck for 3 days.  LP then negative for meningitis.  She reports she had her wisdom tooth removed about 2 months ago, she denies antibiotics being prescribed.  She and her  went to [place] for 4th of July, and symptoms started few days upon return; neck stiffness, fever as high as 106 at home, and headache.  She also reports some sore throat, decreased appetite.  Denies nausea or vomit, no chest pain or cough, no shortness of breath, no abdominal pain, no dysuria increased urinary frequency, no change in bowel movements.  She mentioned she had 1 of her wisdom teeth removed a week before coming to the ER on 7/13, she was not prescribed antibiotics.  From last ER visit on 07/13 she was discharged on doxycycline which she did not  because she was told was not infectious.    Labs with white count of 9.5, left shift 79.8%, H&H 11/33.5, platelet count 244  Hypokalemia 3.1, normal LFTs, normal kidney function   Procalcitonin 0.63, positive   Group a strep flu a, B, COVID negative   UA with hematuria 11, no pyuria, negative for bacteria.    Chest x-ray clear  CT neck with contrast with small amount of fluid within the posterior nasal oropharynx associated with mucosal enhancement.  May represent pharyngitis.  There are enhancing nodular soft tissue densities within the retropharyngeal soft tissue bilaterally which likely represent mildly prominent lymph nodes.  Cervical lymphadenopathy, potentially reactive in etiology.    ID contacted from the ER, discussed with ER attending, will admit for IV antibiotics and further workup.      ID consult for fever.    7/18:  Interim reviewed, patient seen examined at bedside, T-max 101.5° per night,  currently afebrile. She remembers her last tooth extraction was Tuesday of last week and that she had fever and pain before going to the dentist. She mentions it was impacted tooth, no antibiotics sent on discharge.  She reports her pain is a little worse on her left neck, unable to fully swallow food. Labs reviewed, stable WBC,no left shift. Micro reviewed, so far no growth. MRSA nasal negative.    7/19:  Interim reviewed, patient seen examined at bedside,  present.  She reports she had a better night, pain is better controlled, she was able to tolerate diet, afebrile in the last 24 hours, she reports she is feeling better.  Labs reviewed, stable white count, no left shift, CRP and procalcitonin trending down.  Discussed with patient, will complete 2 weeks of antibiotics for likely retropharyngeal abscess, plan to repeat imaging in 2 weeks.    Review of patient's allergies indicates:  No Known Allergies  History reviewed. No pertinent past medical history.  Past Surgical History:   Procedure Laterality Date    neck abscess       Social History     Tobacco Use    Smoking status: Former     Types: Cigarettes    Smokeless tobacco: Never    Tobacco comments:     Just quit smoking   Substance Use Topics    Alcohol use: Yes     Alcohol/week: 5.0 standard drinks     Types: 4 Glasses of wine, 1 Shots of liquor per week     Comment: socially        Family History   Problem Relation Age of Onset    Hypertension Mother     Cancer Maternal Grandmother         breast    Hypertension Maternal Grandfather        Pertinent medications noted:     Review of Systems:   +chills, fever, sweats, weight loss  No change in vision, loss of vision or diplopia  No sinus congestion, purulent nasal discharge, post nasal drip or facial pain  Sore throat, neck pain  No chest pain, palpitations, syncope  No cough, sputum production, shortness of breath, dyspnea on exertion, pleurisy, hemoptysis  No dysphagia, odynophagia  No nausea,  vomiting, diarrhea, constipation, blood in stool, or focal abd pain,    No dysuria, hesitancy, hematuria, retention, incontinence  No swelling of joints, redness of joints, injuries, or new focal pain  No unusual headaches, dizziness, vertigo, numbness, paresthesias, neuropathy, falls  No anxiety, depression, substance abuse, sleep disturbance  No bleeding, lymphadenopathy  No new rashes, lesions, or wounds    Outdoor activities:  Originally from [place], moved to the  in 2019, graduated from [employer] school in [place] 2 months ago.  Just moved to [place] with her .  They just came back from [place] for 4th of July weekend.  Nonsmoker, occasional alcohol, no drugs.  Travel: None  Implants: None  Antibiotic History: Zosyn in the ER     EXAM & DIAGNOSTICS REVIEWED:   Vitals:     Temp:  [97.4 °F (36.3 °C)-99.6 °F (37.6 °C)]   Temp: 98.4 °F (36.9 °C) (07/19/23 1109)  Pulse: 67 (07/19/23 1109)  Resp: 16 (07/19/23 1138)  BP: 107/66 (07/19/23 1109)  SpO2: 99 % (07/19/23 1109)    Intake/Output Summary (Last 24 hours) at 7/19/2023 1247  Last data filed at 7/19/2023 0830  Gross per 24 hour   Intake 725 ml   Output 800 ml   Net -75 ml       General:  In NAD. Alert and attentive, cooperative, comfortable on room air  Eyes:  Anicteric, PERRL  ENT:  No ulcers, exudates, thrush, nares patent, dentition is good, s/p tooth extraction, no evidence of purulence  7/18: Oropharynx with erythema, hypertrophied tonsils, no exudates noted    Neck:  Edema almost resolved, b/l cervical mobile lymphadenopathy less tender to palpation, supple  Lungs: Clear to auscultation b/l  Heart:  S1/S2+, regular rhythm, no murmurs  Abd:  +BS, soft, non tender, non distended, no rebound  :  Voids, no flank tenderness  Musc:  Joints without effusion, swelling,  erythema, synovitis, ambulatory  Skin:  Warm, no rash  Wound:   Neuro:  Following commands, speech is clear, moves all extremities, no acute focal deficit   Psych:  Calm,  cooperative  Lymphatic:     As above   Extrem: No LE edema b/l  VAD:  Peripheral IV       Isolation: None      General Labs reviewed:  Recent Labs   Lab 07/18/23  0350 07/18/23  0841 07/19/23  0331   WBC 6.84 6.18 7.01   HGB 9.3* 9.2* 9.1*   HCT 29.4* 28.1* 28.4*    224 274       Recent Labs   Lab 07/17/23  1122 07/18/23  0350 07/19/23  0331   * 139 135*   K 3.1* 4.0 3.6    104 101   CO2 29 31* 28   BUN 7 <5* <5*   CREATININE 0.7 0.5 0.6   CALCIUM 8.6* 8.6* 8.3*   PROT 7.1 5.9* 6.1   BILITOT 0.6 0.6 0.5   ALKPHOS 50* 40* 41*   ALT 18 16 15   AST 19 16 16     Recent Labs   Lab 07/17/23  1122 07/19/23  0331   CRP 30.91* 17.72*     No results for input(s): SEDRATE in the last 168 hours.    Estimated Creatinine Clearance: 110.1 mL/min (based on SCr of 0.6 mg/dL).     Prior Micro:  Heme Aliquot mL 6.0    Appearance, CSF Clear Clear    Color, CSF Colorless Colorless    WBC, CSF 0 - 5 /cu mm 1    RBC, CSF 0 /cu mm 2 Abnormal     Lymphs, CSF 40 - 80 % 100 High    Glucose 60, protein 25, normal     Micro:  Microbiology Results (last 7 days)       Procedure Component Value Units Date/Time    Throat culture [[id number]] Collected: 07/18/23 0400    Order Status: Completed Specimen: Throat Updated: 07/19/23 1042     RESPIRATORY CULTURE - THROAT Normal respiratory valdez    Blood culture x two cultures. Draw prior to antibiotics. [[id number]] Collected: 07/17/23 1307    Order Status: Completed Specimen: Blood Updated: 07/18/23 1432     Blood Culture, Routine No Growth to date      No Growth to date    Narrative:      Aerobic and anaerobic  Collection has been rescheduled by CH10 at 07/17/2023 11:23 Reason:   Patient not in room  Collection has been rescheduled by CH10 at 07/17/2023 11:23 Reason:   Patient not in room    Blood culture x two cultures. Draw prior to antibiotics. [[id number]] Collected: 07/17/23 1307    Order Status: Completed Specimen: Blood Updated: 07/18/23 1432     Blood Culture, Routine No Growth  to date      No Growth to date    Narrative:      Aerobic and anaerobic  Collection has been rescheduled by CH at 07/17/2023 11:23 Reason:   Patient not in room  Collection has been rescheduled by 10 at 07/17/2023 11:23 Reason:   Patient not in room    MRSA Screen by PCR [[id number]] Collected: 07/18/23 0400    Order Status: Completed Specimen: Nasopharyngeal Swab from Nasal Updated: 07/18/23 0606     MRSA SCREEN BY PCR Negative    Group A Strep, Molecular [[id number]] Collected: 07/17/23 1320    Order Status: Completed Specimen: Throat Updated: 07/17/23 1438     Group A Strep, Molecular Negative     Comment: Arcanobacterium haemolyticum and Beta Streptococcus group C   and G will not be detected by this test method.  Please order   Throat Culture ([id number]) if suspected.         Strep A culture, throat [[id number]] Collected: 07/17/23 1428    Order Status: Canceled Specimen: Throat             Imaging Reviewed:  CXR   CT neck w/ IV contrast:  Small amount of fluid within the posterior nasal/oropharynx associated with mucosal enhancement. This appearance may represent pharyngitis. There are enhancing nodular soft tissue densities within the retropharyngeal soft tissues bilaterally which likely represent mildly prominent lymph nodes. A small node located towards the right of midline appears to contain central cystic degeneration/necrosis.   Cervical lymphadenopathy, potentially reactive in etiology. Clinical follow-up is indicated.   Mild prominence of the pharyngeal and lingual tonsils demonstrating heterogeneous enhancement.     LE venous US:  No evidence of venous thrombosis involving the bilateral upper extremity venous system.     Cardiology: EKG: Qtc: 411ms       IMPRESSION & PLAN     Sepsis, rule out retropharyngeal abscess, recent tooth extraction - improving   Procal 0.63, positive -->0.23, trending down  CRP 30.9-->19.7, trending down  Group A Strep x 2 negative  Blood cultures x 2 in process  MRSA nasal  swab negative  LDH normal   HIV 4th gen negative    Recommendations:  Upon discharge, Augmentin 875 mg/125 mg p.o. twice a day and doxycycline 100 mg p.o. twice a day for 2 weeks   Needs repeat CT neck with IV contrast in 2 weeks   Follow-up ID clinic in 2 weeks     D/w patient,  at bedside, nursing, Dr Bess    Will sign-off, call us back with any questions     Medical Decision Making during this encounter was  [_] Low Complexity  [_] Moderate Complexity  [xx] High Complexity

## 2023-07-20 LAB
BACTERIA THROAT CULT: NORMAL
CMV DNA SERPL NAA+PROBE-ACNC: NEGATIVE IU/ML
CMV DNA SERPL NAA+PROBE-LOG IU: NORMAL LOG10 IU/ML

## 2023-07-22 LAB
BACTERIA BLD CULT: NORMAL
BACTERIA BLD CULT: NORMAL

## 2023-07-27 ENCOUNTER — HOSPITAL ENCOUNTER (OUTPATIENT)
Facility: HOSPITAL | Age: 28
Discharge: LEFT BEFORE DISCHARGE | End: 2023-07-28
Attending: EMERGENCY MEDICINE | Admitting: INTERNAL MEDICINE

## 2023-07-27 DIAGNOSIS — R07.9 CHEST PAIN: ICD-10-CM

## 2023-07-27 DIAGNOSIS — I95.9 HYPOTENSION: ICD-10-CM

## 2023-07-27 LAB
ALBUMIN SERPL BCP-MCNC: 3.9 G/DL (ref 3.5–5.2)
ALP SERPL-CCNC: 56 U/L (ref 55–135)
ALT SERPL W/O P-5'-P-CCNC: 16 U/L (ref 10–44)
ANION GAP SERPL CALC-SCNC: 8 MMOL/L (ref 8–16)
AST SERPL-CCNC: 19 U/L (ref 10–40)
BASOPHILS # BLD AUTO: 0.05 K/UL (ref 0–0.2)
BASOPHILS NFR BLD: 0.4 % (ref 0–1.9)
BILIRUB SERPL-MCNC: 0.5 MG/DL (ref 0.1–1)
BUN SERPL-MCNC: 12 MG/DL (ref 6–20)
CALCIUM SERPL-MCNC: 9.3 MG/DL (ref 8.7–10.5)
CHLORIDE SERPL-SCNC: 105 MMOL/L (ref 95–110)
CO2 SERPL-SCNC: 25 MMOL/L (ref 23–29)
CREAT SERPL-MCNC: 0.6 MG/DL (ref 0.5–1.4)
DIFFERENTIAL METHOD: ABNORMAL
EOSINOPHIL # BLD AUTO: 0.1 K/UL (ref 0–0.5)
EOSINOPHIL NFR BLD: 0.6 % (ref 0–8)
ERYTHROCYTE [DISTWIDTH] IN BLOOD BY AUTOMATED COUNT: 13.9 % (ref 11.5–14.5)
EST. GFR  (NO RACE VARIABLE): >60 ML/MIN/1.73 M^2
GLUCOSE SERPL-MCNC: 107 MG/DL (ref 70–110)
GLUCOSE SERPL-MCNC: 92 MG/DL (ref 70–110)
HCT VFR BLD AUTO: 34.4 % (ref 37–48.5)
HGB BLD-MCNC: 11 G/DL (ref 12–16)
IMM GRANULOCYTES # BLD AUTO: 0.04 K/UL (ref 0–0.04)
IMM GRANULOCYTES NFR BLD AUTO: 0.4 % (ref 0–0.5)
LYMPHOCYTES # BLD AUTO: 6.1 K/UL (ref 1–4.8)
LYMPHOCYTES NFR BLD: 54 % (ref 18–48)
MCH RBC QN AUTO: 27.9 PG (ref 27–31)
MCHC RBC AUTO-ENTMCNC: 32 G/DL (ref 32–36)
MCV RBC AUTO: 87 FL (ref 82–98)
MONOCYTES # BLD AUTO: 0.8 K/UL (ref 0.3–1)
MONOCYTES NFR BLD: 6.9 % (ref 4–15)
NEUTROPHILS # BLD AUTO: 4.2 K/UL (ref 1.8–7.7)
NEUTROPHILS NFR BLD: 37.7 % (ref 38–73)
NRBC BLD-RTO: 0 /100 WBC
PLATELET # BLD AUTO: 622 K/UL (ref 150–450)
PMV BLD AUTO: 9.7 FL (ref 9.2–12.9)
POTASSIUM SERPL-SCNC: 3.5 MMOL/L (ref 3.5–5.1)
PROT SERPL-MCNC: 7.7 G/DL (ref 6–8.4)
RBC # BLD AUTO: 3.94 M/UL (ref 4–5.4)
SODIUM SERPL-SCNC: 138 MMOL/L (ref 136–145)
WBC # BLD AUTO: 11.25 K/UL (ref 3.9–12.7)

## 2023-07-27 PROCEDURE — 84443 ASSAY THYROID STIM HORMONE: CPT | Performed by: INTERNAL MEDICINE

## 2023-07-27 PROCEDURE — 85025 COMPLETE CBC W/AUTO DIFF WBC: CPT | Performed by: EMERGENCY MEDICINE

## 2023-07-27 PROCEDURE — 82962 GLUCOSE BLOOD TEST: CPT

## 2023-07-27 PROCEDURE — 99285 EMERGENCY DEPT VISIT HI MDM: CPT | Mod: 25

## 2023-07-27 PROCEDURE — 84466 ASSAY OF TRANSFERRIN: CPT | Performed by: INTERNAL MEDICINE

## 2023-07-27 PROCEDURE — 93010 EKG 12-LEAD: ICD-10-PCS | Mod: ,,, | Performed by: SPECIALIST

## 2023-07-27 PROCEDURE — 82607 VITAMIN B-12: CPT | Performed by: INTERNAL MEDICINE

## 2023-07-27 PROCEDURE — 93010 ELECTROCARDIOGRAM REPORT: CPT | Mod: ,,, | Performed by: SPECIALIST

## 2023-07-27 PROCEDURE — 82746 ASSAY OF FOLIC ACID SERUM: CPT | Performed by: INTERNAL MEDICINE

## 2023-07-27 PROCEDURE — 82728 ASSAY OF FERRITIN: CPT | Performed by: INTERNAL MEDICINE

## 2023-07-27 PROCEDURE — 80053 COMPREHEN METABOLIC PANEL: CPT | Performed by: EMERGENCY MEDICINE

## 2023-07-27 PROCEDURE — 93005 ELECTROCARDIOGRAM TRACING: CPT | Performed by: SPECIALIST

## 2023-07-27 NOTE — Clinical Note
Diagnosis: Hypotension [209222]   Future Attending Provider: MAHAMED SKINNER [[id number]]   Place in Observation: [facility] [[id number]]   Admitting Provider:: MAHAMED SKINNER [[id number]]

## 2023-07-28 VITALS
DIASTOLIC BLOOD PRESSURE: 54 MMHG | TEMPERATURE: 98 F | RESPIRATION RATE: 16 BRPM | HEART RATE: 49 BPM | WEIGHT: 110 LBS | SYSTOLIC BLOOD PRESSURE: 106 MMHG | HEIGHT: 62 IN | BODY MASS INDEX: 20.24 KG/M2 | OXYGEN SATURATION: 98 %

## 2023-07-28 PROBLEM — M54.50 LOWER BACK PAIN: Status: ACTIVE | Noted: 2023-07-28

## 2023-07-28 PROBLEM — R20.2 PARESTHESIA: Status: ACTIVE | Noted: 2023-07-28

## 2023-07-28 PROBLEM — Z87.891 EX-SMOKER: Chronic | Status: ACTIVE | Noted: 2023-07-28

## 2023-07-28 PROBLEM — I95.9 HYPOTENSION: Status: ACTIVE | Noted: 2023-07-28

## 2023-07-28 LAB
FERRITIN SERPL-MCNC: 69 NG/ML (ref 20–300)
FOLATE SERPL-MCNC: 9.3 NG/ML (ref 4–24)
IRON SERPL-MCNC: 107 UG/DL (ref 30–160)
SATURATED IRON: 32 % (ref 20–50)
TOTAL IRON BINDING CAPACITY: 335 UG/DL (ref 250–450)
TRANSFERRIN SERPL-MCNC: 239 MG/DL (ref 200–375)
TSH SERPL DL<=0.005 MIU/L-ACNC: 1.38 UIU/ML (ref 0.34–5.6)
VIT B12 SERPL-MCNC: 887 PG/ML (ref 210–950)

## 2023-07-28 PROCEDURE — G0378 HOSPITAL OBSERVATION PER HR: HCPCS

## 2023-07-28 PROCEDURE — 63600175 PHARM REV CODE 636 W HCPCS: Performed by: EMERGENCY MEDICINE

## 2023-07-28 PROCEDURE — 25000003 PHARM REV CODE 250: Performed by: INTERNAL MEDICINE

## 2023-07-28 PROCEDURE — 36415 COLL VENOUS BLD VENIPUNCTURE: CPT | Performed by: INTERNAL MEDICINE

## 2023-07-28 PROCEDURE — 96374 THER/PROPH/DIAG INJ IV PUSH: CPT

## 2023-07-28 PROCEDURE — 96361 HYDRATE IV INFUSION ADD-ON: CPT

## 2023-07-28 PROCEDURE — 63600175 PHARM REV CODE 636 W HCPCS: Performed by: INTERNAL MEDICINE

## 2023-07-28 RX ORDER — DOXYCYCLINE 100 MG/1
100 CAPSULE ORAL 2 TIMES DAILY
Status: DISCONTINUED | OUTPATIENT
Start: 2023-07-28 | End: 2023-07-28 | Stop reason: HOSPADM

## 2023-07-28 RX ORDER — KETOROLAC TROMETHAMINE 30 MG/ML
10 INJECTION, SOLUTION INTRAMUSCULAR; INTRAVENOUS
Status: COMPLETED | OUTPATIENT
Start: 2023-07-28 | End: 2023-07-28

## 2023-07-28 RX ORDER — IBUPROFEN 200 MG
24 TABLET ORAL
Status: DISCONTINUED | OUTPATIENT
Start: 2023-07-28 | End: 2023-07-28 | Stop reason: HOSPADM

## 2023-07-28 RX ORDER — SODIUM CHLORIDE, SODIUM LACTATE, POTASSIUM CHLORIDE, CALCIUM CHLORIDE 600; 310; 30; 20 MG/100ML; MG/100ML; MG/100ML; MG/100ML
INJECTION, SOLUTION INTRAVENOUS CONTINUOUS
Status: DISCONTINUED | OUTPATIENT
Start: 2023-07-28 | End: 2023-07-28 | Stop reason: HOSPADM

## 2023-07-28 RX ORDER — ACETAMINOPHEN 325 MG/1
650 TABLET ORAL EVERY 4 HOURS PRN
Status: DISCONTINUED | OUTPATIENT
Start: 2023-07-28 | End: 2023-07-28 | Stop reason: HOSPADM

## 2023-07-28 RX ORDER — GLUCAGON 1 MG
1 KIT INJECTION
Status: DISCONTINUED | OUTPATIENT
Start: 2023-07-28 | End: 2023-07-28 | Stop reason: HOSPADM

## 2023-07-28 RX ORDER — SODIUM CHLORIDE 0.9 % (FLUSH) 0.9 %
10 SYRINGE (ML) INJECTION EVERY 6 HOURS PRN
Status: DISCONTINUED | OUTPATIENT
Start: 2023-07-28 | End: 2023-07-28 | Stop reason: HOSPADM

## 2023-07-28 RX ORDER — ACETAMINOPHEN 500 MG
1 TABLET ORAL 2 TIMES DAILY
COMMUNITY

## 2023-07-28 RX ORDER — IBUPROFEN 200 MG
16 TABLET ORAL
Status: DISCONTINUED | OUTPATIENT
Start: 2023-07-28 | End: 2023-07-28 | Stop reason: HOSPADM

## 2023-07-28 RX ORDER — AMOXICILLIN AND CLAVULANATE POTASSIUM 875; 125 MG/1; MG/1
1 TABLET, FILM COATED ORAL EVERY 12 HOURS
Status: DISCONTINUED | OUTPATIENT
Start: 2023-07-28 | End: 2023-07-28 | Stop reason: HOSPADM

## 2023-07-28 RX ADMIN — SODIUM CHLORIDE, SODIUM LACTATE, POTASSIUM CHLORIDE, AND CALCIUM CHLORIDE: .6; .31; .03; .02 INJECTION, SOLUTION INTRAVENOUS at 03:07

## 2023-07-28 RX ADMIN — DOXYCYCLINE HYCLATE 100 MG: 100 CAPSULE ORAL at 09:07

## 2023-07-28 RX ADMIN — KETOROLAC TROMETHAMINE 10 MG: 30 INJECTION, SOLUTION INTRAMUSCULAR; INTRAVENOUS at 12:07

## 2023-07-28 RX ADMIN — SODIUM CHLORIDE, SODIUM LACTATE, POTASSIUM CHLORIDE, AND CALCIUM CHLORIDE 1000 ML: .6; .31; .03; .02 INJECTION, SOLUTION INTRAVENOUS at 12:07

## 2023-07-28 RX ADMIN — AMOXICILLIN AND CLAVULANATE POTASSIUM 1 TABLET: 875; 125 TABLET, FILM COATED ORAL at 09:07

## 2023-07-28 RX ADMIN — AMOXICILLIN AND CLAVULANATE POTASSIUM 1 TABLET: 875; 125 TABLET, FILM COATED ORAL at 03:07

## 2023-07-28 RX ADMIN — DOXYCYCLINE HYCLATE 100 MG: 100 CAPSULE ORAL at 03:07

## 2023-07-28 NOTE — ASSESSMENT & PLAN NOTE
Presenting with migrating paresthesia including tingling to left face, left upper extremity, lower extremity, improved in ED   CT head no acute   Neuro examination nonfocal  Low suspicion TIA/stroke given limited risk factors   Calcium within normal, recent HbA1c 4.9%   Check TSH, B12, folic acid  Monitoring clinically

## 2023-07-28 NOTE — ASSESSMENT & PLAN NOTE
Mild anemia, no evidence of bleeding  Check anemia labs including B12, folic acid, iron studies  Trending H&H

## 2023-07-28 NOTE — ASSESSMENT & PLAN NOTE
Previous LP and reporting back pain surrounding area   MRI lumbar spine no acute   Neuro exam nonfocal   Monitoring clinically

## 2023-07-28 NOTE — PLAN OF CARE
[facility] - Emergency Dept  Initial Discharge Assessment       Primary Care Provider: Primary Doctor No    Admission Diagnosis: Hypotension [I95.9]    Admission Date: 7/27/2023    Assessment completed with pt and Spouse - Gerald Avina [phone] at bedside . Pt confirmed home address and denied HH and DME. Pt with recent admits 7/17-7/19/23 and discharged home after lumbar puncture with meningitis. PT is not insured and stated that she has not been screened for medicaid services. PTs does not have PCP and stated that spouse will provide transport home. CM following for additional needs.   Expected Discharge Date:     Transition of Care Barriers: Unisured      Payor: /     Extended Emergency Contact Information  Primary Emergency Contact: Gerald Avina  Mobile Phone: [phone]  Relation: Significant other  Preferred language: English   needed? No    Discharge Plan A: Home with family  Discharge Plan B: Home      [id number] - [place], [place] - [address]  [address] [address]  Phone: [phone] Fax: [phone]    [id number] - [place], [place] - [address] & [facility]  [address]  [facility] [address]  Phone: [phone] Fax: [phone]    [facility] [address]  [facility] [address]  Phone: [phone] Fax: [phone]      Initial Assessment (most recent)       Adult Discharge Assessment - 07/28/23 1052          Discharge Assessment    Assessment Type Discharge Planning Assessment     Confirmed/corrected address, phone number and insurance Yes     Confirmed Demographics Correct on Facesheet     Source of Information patient;family     Does patient/caregiver understand observation status Yes     Communicated SHANTANU with patient/caregiver Yes     Reason For Admission Hypotension     People in Home spouse     Facility Arrived From: Home     Do you expect to return to your current  living situation? Yes     Do you have help at home or someone to help you manage your care at home? Yes     Who are your caregiver(s) and their phone number(s)? Spouse - Gerald Avina [phone]     Prior to hospitilization cognitive status: Alert/Oriented     Current cognitive status: Alert/Oriented     Home Layout Able to live on 1st floor     Equipment Currently Used at Home none     Readmission within 30 days? Yes     Patient currently being followed by outpatient case management? No     Do you currently have service(s) that help you manage your care at home? No     Do you take prescription medications? No     Do you have prescription coverage? No     Do you have any problems affording any of your prescribed medications? No     Who is going to help you get home at discharge? Spouse - Gerald Avina [phone]     How do you get to doctors appointments? family or friend will provide     Are you on dialysis? No     Do you take coumadin? No     Discharge Plan A Home with family     Discharge Plan B Home     DME Needed Upon Discharge  none     Discharge Plan discussed with: Patient;Spouse/sig other     Name(s) and Number(s) Spouse - Gerald Avina [phone]     Transition of Care Barriers Unisured        Physical Activity    On average, how many days per week do you engage in moderate to strenuous exercise (like a brisk walk)? Patient refused        OTHER    Name(s) of People in Home Spouse - Gerald Avina [phone]

## 2023-07-28 NOTE — HPI
Ms. Moreno is a 27-year-old female who presented to the ED with paresthesia and lower back pain.  Patient with recent [facility] admission for fever with diagnosis of pharyngitis with lymphadenopathy, discharged on 07/19 to complete 2 week course of augmentin and doxycycline with plans for repeat outpatient CT and Infectious Disease follow-up.  She reports 2 hour history of lower back pain associated with bilateral lower extremity weakness, decreased sensation, left upper extremity and left facial tingling.  States since ED arrival symptoms have improved.  Reports she has been eating and drinking well, initially had loose stools with antibiotics but this has subsequently improved, no fever, nausea, vomiting, chest pain, shortness of breath, abdominal pain.  States at time she does have yeast infection but denies any current symptoms.  During last admission she did have LP performed, reporting intermittent pain lower back over previous site.  Previous smoker half pack per day, recently quit, social alcohol use, denies any recreational drug use.  She has been compliant with antibiotic therapy.  In the ED blood pressure 74/40, heart rate 115, subsequently vitals improving.  Labs with WBC 11, hemoglobin 11, platelets 627, BUN/creatinine 12/0.6, MRI lumbar spine no acute, CT head no acute.  She was ordered for Toradol and 1 L LR in ED.  Case discussed with ED provider who requested admission.  Plan of care discussed with patient and significant other at bedside.

## 2023-07-28 NOTE — H&P
Formerly [facility], [facility] - Emergency Dept  Hospital Medicine  History & Physical    Patient Name: Kade Moreno  MRN: [id number]  Patient Class: OP- Observation  Admission Date: 7/27/2023  Attending Physician: Ritika Cordon MD   Primary Care Provider: Primary Doctor No         Patient information was obtained from patient, past medical records and ER records.     Subjective:     Principal Problem:Hypotension    Chief Complaint:   Chief Complaint   Patient presents with    Back Pain     Recent LP for meningitis workup, which was negative  Acute low back pain tonight,   Weakness to BLE    Numbness     Lt lower facial numbness x 45min, no droop        HPI: Ms. Moreno is a 27-year-old female who presented to the ED with paresthesia and lower back pain.  Patient with recent [facility] admission for fever with diagnosis of pharyngitis with lymphadenopathy, discharged on 07/19 to complete 2 week course of augmentin and doxycycline with plans for repeat outpatient CT and Infectious Disease follow-up.  She reports 2 hour history of lower back pain associated with bilateral lower extremity weakness, decreased sensation, left upper extremity and left facial tingling.  States since ED arrival symptoms have improved.  Reports she has been eating and drinking well, initially had loose stools with antibiotics but this has subsequently improved, no fever, nausea, vomiting, chest pain, shortness of breath, abdominal pain.  States at time she does have yeast infection but denies any current symptoms.  During last admission she did have LP performed, reporting intermittent pain lower back over previous site.  Previous smoker half pack per day, recently quit, social alcohol use, denies any recreational drug use.  She has been compliant with antibiotic therapy.  In the ED blood pressure 74/40, heart rate 115, subsequently vitals improving.  Labs with WBC 11, hemoglobin 11, platelets 627, BUN/creatinine 12/0.6, MRI lumbar spine  no acute, CT head no acute.  She was ordered for Toradol and 1 L LR in ED.  Case discussed with ED provider who requested admission.  Plan of care discussed with patient and significant other at bedside.      Past Medical History:   Diagnosis Date    Pharyngitis        Past Surgical History:   Procedure Laterality Date    neck abscess      No past surgical history         Review of patient's allergies indicates:  No Known Allergies    No current facility-administered medications on file prior to encounter.     Current Outpatient Medications on File Prior to Encounter   Medication Sig    acetaminophen (TYLENOL EXTRA STRENGTH) 500 MG tablet Take 1,000 mg by mouth every 6 (six) hours as needed for Pain.    amoxicillin-clavulanate 875-125mg (AUGMENTIN) 875-125 mg per tablet Take 1 tablet by mouth every 12 (twelve) hours. for 14 days    doxycycline (VIBRAMYCIN) 100 MG Cap Take 1 capsule (100 mg total) by mouth 2 (two) times daily. for 14 days    HYDROcodone-acetaminophen (NORCO) 7.5-325 mg per tablet Take 1 tablet by mouth every 4 to 6 hours as needed.    ibuprofen (ADVIL,MOTRIN) 200 MG tablet Take 400 mg by mouth every 6 (six) hours as needed for Pain.     Family History       Problem Relation (Age of Onset)    Cancer Maternal Grandmother    Hypertension Mother, Maternal Grandfather          Tobacco Use    Smoking status: Former     Types: Cigarettes    Smokeless tobacco: Never    Tobacco comments:     Recent ex smoker 1/2 ppd   Substance and Sexual Activity    Alcohol use: Yes     Alcohol/week: 5.0 standard drinks     Types: 4 Glasses of wine, 1 Shots of liquor per week     Comment: socially    Drug use: Never    Sexual activity: Yes     Partners: Male     Birth control/protection: Condom     Review of Systems   Constitutional:  Negative for chills and fever.   Respiratory:  Negative for shortness of breath.    Cardiovascular:  Negative for chest pain, palpitations and leg swelling.   Gastrointestinal:   Negative for abdominal pain, diarrhea, nausea and vomiting.   Genitourinary:  Negative for dysuria and frequency.   Musculoskeletal:  Positive for back pain.   Psychiatric/Behavioral:  Negative for confusion.    Objective:     Vital Signs (Most Recent):  Temp: 98.1 °F (36.7 °C) (07/27/23 2015)  Pulse: 60 (07/28/23 0046)  Resp: 16 (07/28/23 0046)  BP: (!) 94/52 (07/28/23 0046)  SpO2: 100 % (07/28/23 0046) Vital Signs (24h Range):  Temp:  [98.1 °F (36.7 °C)] 98.1 °F (36.7 °C)  Pulse:  [] 60  Resp:  [14-22] 16  SpO2:  [95 %-100 %] 100 %  BP: ()/(40-76) 94/52     Weight: 49.9 kg (110 lb)  Body mass index is 20.12 kg/m².     Physical Exam  Vitals and nursing note reviewed.   Constitutional:       General: She is not in acute distress.     Appearance: She is obese. She is not ill-appearing, toxic-appearing or diaphoretic.   HENT:      Head: Normocephalic and atraumatic.      Mouth/Throat:      Mouth: Mucous membranes are moist.      Comments: No oral thrush  Cardiovascular:      Rate and Rhythm: Normal rate and regular rhythm.      Comments: Warm peripheries, no LE edema  Pulmonary:      Effort: Pulmonary effort is normal. No respiratory distress.      Breath sounds: Normal breath sounds. No stridor. No wheezing or rhonchi.      Comments: On RA  Abdominal:      General: Bowel sounds are normal. There is no distension.      Palpations: Abdomen is soft.      Tenderness: There is no abdominal tenderness. There is no guarding.   Genitourinary:     Comments: No supra pubic fullness or tenderness  Musculoskeletal:      Cervical back: Neck supple.      Comments: Point tenderness to palpate lower back    Skin:     General: Skin is warm and dry.   Neurological:      General: No focal deficit present.      Mental Status: She is alert and oriented to person, place, and time. Mental status is at baseline.      Cranial Nerves: No cranial nerve deficit.      Sensory: No sensory deficit.   Psychiatric:         Mood and  Affect: Mood normal.              Significant Labs: Blood Culture: No results for input(s): LABBLOO in the last 48 hours.  BMP:   Recent Labs   Lab 07/27/23 2047   GLU 92      K 3.5      CO2 25   BUN 12   CREATININE 0.6   CALCIUM 9.3     CBC:   Recent Labs   Lab 07/27/23 2047   WBC 11.25   HGB 11.0*   HCT 34.4*   *     CMP:   Recent Labs   Lab 07/27/23 2047      K 3.5      CO2 25   GLU 92   BUN 12   CREATININE 0.6   CALCIUM 9.3   PROT 7.7   ALBUMIN 3.9   BILITOT 0.5   ALKPHOS 56   AST 19   ALT 16   ANIONGAP 8     Cardiac Markers: No results for input(s): CKMB, MYOGLOBIN, BNP, TROPISTAT in the last 48 hours.  Lactic Acid: No results for input(s): LACTATE in the last 48 hours.  Magnesium: No results for input(s): MG in the last 48 hours.  POCT Glucose: No results for input(s): POCTGLUCOSE in the last 48 hours.  Troponin: No results for input(s): TROPONINI, TROPONINIHS in the last 48 hours.  TSH: No results for input(s): TSH in the last 4320 hours.  Urine Culture: No results for input(s): LABURIN in the last 48 hours.  Urine Studies: No results for input(s): COLORU, APPEARANCEUA, PHUR, SPECGRAV, PROTEINUA, GLUCUA, KETONESU, BILIRUBINUA, OCCULTUA, NITRITE, UROBILINOGEN, LEUKOCYTESUR, RBCUA, WBCUA, BACTERIA, SQUAMEPITHEL, HYALINECASTS in the last 48 hours.    Invalid input(s): WRIGHTSUR    Significant Imaging: I have reviewed all pertinent imaging results/findings within the past 24 hours.    CT Head Without Contrast    Result Date: 7/27/2023  EXAM DESCRIPTION: CT HEAD WITHOUT CONTRAST CLINICAL HISTORY: Neuro deficit, acute, stroke suspected TECHNIQUE: 5mm slice thickness axial images through the brain were performed in bone and soft tissue windows in the absence of intravenous contrast.  This exam was performed according to our departmental dose-optimization program which includes use of Automated Exposure Control, adjustment of the mA and/or kV according to patient size and/or use of  iterative reconstruction technique. COMPARISON: July 13, 2023. FINDINGS: The brain parenchyma appears unremarkable. There is no intra-axial or extra-axial bleed seen. There is no mass or mass effect. The ventricles are normal in size shape and configuration. The orbital contents appear unremarkable. The visualized paranasal sinuses and mastoid air cells are patent. No fracture is present. IMPRESSION: No acute intracranial abnormality identified. If there is continuing clinical concern about acute infarction, MRI can BE obtained. Electronically signed by:  Moises Avila MD, MBA  7/27/2023 10:10 PM CDT Workstation: [id number]      MRI Lumbar Spine Without Contrast    Result Date: 7/27/2023  MR LUMBAR SPINE WITHOUT IV CONTRAST COMPARISON: None Additional pertinent history: Low back pain with prior surgery. Technique: Multiplanar multisequence lumbar spine MRI was performed without gadolinium enhancement. FINDINGS: Vertebral body heights and alignment: Negative. Vertebral marrow signal: Negative. Distal thoracic cord and conus: Negative.  The conus ends at L1. Surrounding soft tissues: Negative. Inspection of the disc spaces reveal the following: L5-S1: Negative. L4-L5: Negative. L3-L4: Negative. L2-L3: Negative. L1-L2: Negative. T12-L1: Negative. IMPRESSION:   Normal lumbar spine MRI without contrast. Electronically signed by:  Juan Victoria MD  7/27/2023 9:44 PM CDT Workstation: [id number]          Assessment/Plan:     * Hypotension  Reports at home blood pressure usually 120/60, on presentation hypotensive but improving   No definite new signs of infection, no history of increase fluid loss/dehydration  Continue IV fluid hydration monitor blood pressure   If remains hypotensive consider further evaluation      Paresthesia  Presenting with migrating paresthesia including tingling to left face, left upper extremity, lower extremity, improved in ED   CT head no acute   Neuro examination nonfocal  Low suspicion  TIA/stroke given limited risk factors   Calcium within normal, recent HbA1c 4.9%   Check TSH, B12, folic acid  Monitoring clinically      Lower back pain  Previous LP and reporting back pain surrounding area   MRI lumbar spine no acute   Neuro exam nonfocal   Monitoring clinically      Ex-smoker  Recent ex-smoker half pack per day   Continue to reinforce smoking cessation      Anemia  Mild anemia, no evidence of bleeding  Check anemia labs including B12, folic acid, iron studies  Trending H&H      Pharyngitis with lymphadenopathy  Recent hospital admission for same  Workup grossly unrevealing, LP performed, CMV IgG positive but IgM negative, PCR negative, HIV negative  Continue Augmentin and doxycycline to complete course of antibiotic      VTE Risk Mitigation (From admission, onward)         Ordered     IP VTE HIGH RISK PATIENT  Once         07/28/23 0103     Place sequential compression device  Until discontinued         07/28/23 0103                   On 07/28/2023, patient should be placed in hospital observation services under my care.        Ritika Cordon MD  Department of Hospital Medicine  [facility] - Emergency Dept

## 2023-07-28 NOTE — ASSESSMENT & PLAN NOTE
Reports at home blood pressure usually 120/60, on presentation hypotensive but improving   No definite new signs of infection, no history of increase fluid loss/dehydration  Continue IV fluid hydration monitor blood pressure   If remains hypotensive consider further evaluation

## 2023-07-28 NOTE — SUBJECTIVE & OBJECTIVE
Past Medical History:   Diagnosis Date    Pharyngitis        Past Surgical History:   Procedure Laterality Date    neck abscess      No past surgical history         Review of patient's allergies indicates:  No Known Allergies    No current facility-administered medications on file prior to encounter.     Current Outpatient Medications on File Prior to Encounter   Medication Sig    acetaminophen (TYLENOL EXTRA STRENGTH) 500 MG tablet Take 1,000 mg by mouth every 6 (six) hours as needed for Pain.    amoxicillin-clavulanate 875-125mg (AUGMENTIN) 875-125 mg per tablet Take 1 tablet by mouth every 12 (twelve) hours. for 14 days    doxycycline (VIBRAMYCIN) 100 MG Cap Take 1 capsule (100 mg total) by mouth 2 (two) times daily. for 14 days    HYDROcodone-acetaminophen (NORCO) 7.5-325 mg per tablet Take 1 tablet by mouth every 4 to 6 hours as needed.    ibuprofen (ADVIL,MOTRIN) 200 MG tablet Take 400 mg by mouth every 6 (six) hours as needed for Pain.     Family History       Problem Relation (Age of Onset)    Cancer Maternal Grandmother    Hypertension Mother, Maternal Grandfather          Tobacco Use    Smoking status: Former     Types: Cigarettes    Smokeless tobacco: Never    Tobacco comments:     Recent ex smoker 1/2 ppd   Substance and Sexual Activity    Alcohol use: Yes     Alcohol/week: 5.0 standard drinks     Types: 4 Glasses of wine, 1 Shots of liquor per week     Comment: socially    Drug use: Never    Sexual activity: Yes     Partners: Male     Birth control/protection: Condom     Review of Systems   Constitutional:  Negative for chills and fever.   Respiratory:  Negative for shortness of breath.    Cardiovascular:  Negative for chest pain, palpitations and leg swelling.   Gastrointestinal:  Negative for abdominal pain, diarrhea, nausea and vomiting.   Genitourinary:  Negative for dysuria and frequency.   Musculoskeletal:  Positive for back pain.   Psychiatric/Behavioral:  Negative for confusion.    Objective:      Vital Signs (Most Recent):  Temp: 98.1 °F (36.7 °C) (07/27/23 2015)  Pulse: 60 (07/28/23 0046)  Resp: 16 (07/28/23 0046)  BP: (!) 94/52 (07/28/23 0046)  SpO2: 100 % (07/28/23 0046) Vital Signs (24h Range):  Temp:  [98.1 °F (36.7 °C)] 98.1 °F (36.7 °C)  Pulse:  [] 60  Resp:  [14-22] 16  SpO2:  [95 %-100 %] 100 %  BP: ()/(40-76) 94/52     Weight: 49.9 kg (110 lb)  Body mass index is 20.12 kg/m².     Physical Exam  Vitals and nursing note reviewed.   Constitutional:       General: She is not in acute distress.     Appearance: She is obese. She is not ill-appearing, toxic-appearing or diaphoretic.   HENT:      Head: Normocephalic and atraumatic.      Mouth/Throat:      Mouth: Mucous membranes are moist.      Comments: No oral thrush  Cardiovascular:      Rate and Rhythm: Normal rate and regular rhythm.      Comments: Warm peripheries, no LE edema  Pulmonary:      Effort: Pulmonary effort is normal. No respiratory distress.      Breath sounds: Normal breath sounds. No stridor. No wheezing or rhonchi.      Comments: On RA  Abdominal:      General: Bowel sounds are normal. There is no distension.      Palpations: Abdomen is soft.      Tenderness: There is no abdominal tenderness. There is no guarding.   Genitourinary:     Comments: No supra pubic fullness or tenderness  Musculoskeletal:      Cervical back: Neck supple.      Comments: Point tenderness to palpate lower back    Skin:     General: Skin is warm and dry.   Neurological:      General: No focal deficit present.      Mental Status: She is alert and oriented to person, place, and time. Mental status is at baseline.      Cranial Nerves: No cranial nerve deficit.      Sensory: No sensory deficit.   Psychiatric:         Mood and Affect: Mood normal.              Significant Labs: Blood Culture: No results for input(s): LABBLOO in the last 48 hours.  BMP:   Recent Labs   Lab 07/27/23 2047   GLU 92      K 3.5      CO2 25   BUN 12   CREATININE  0.6   CALCIUM 9.3     CBC:   Recent Labs   Lab 07/27/23 2047   WBC 11.25   HGB 11.0*   HCT 34.4*   *     CMP:   Recent Labs   Lab 07/27/23 2047      K 3.5      CO2 25   GLU 92   BUN 12   CREATININE 0.6   CALCIUM 9.3   PROT 7.7   ALBUMIN 3.9   BILITOT 0.5   ALKPHOS 56   AST 19   ALT 16   ANIONGAP 8     Cardiac Markers: No results for input(s): CKMB, MYOGLOBIN, BNP, TROPISTAT in the last 48 hours.  Lactic Acid: No results for input(s): LACTATE in the last 48 hours.  Magnesium: No results for input(s): MG in the last 48 hours.  POCT Glucose: No results for input(s): POCTGLUCOSE in the last 48 hours.  Troponin: No results for input(s): TROPONINI, TROPONINIHS in the last 48 hours.  TSH: No results for input(s): TSH in the last 4320 hours.  Urine Culture: No results for input(s): LABURIN in the last 48 hours.  Urine Studies: No results for input(s): COLORU, APPEARANCEUA, PHUR, SPECGRAV, PROTEINUA, GLUCUA, KETONESU, BILIRUBINUA, OCCULTUA, NITRITE, UROBILINOGEN, LEUKOCYTESUR, RBCUA, WBCUA, BACTERIA, SQUAMEPITHEL, HYALINECASTS in the last 48 hours.    Invalid input(s): WRIGHTSUR    Significant Imaging: I have reviewed all pertinent imaging results/findings within the past 24 hours.    CT Head Without Contrast    Result Date: 7/27/2023  EXAM DESCRIPTION: CT HEAD WITHOUT CONTRAST CLINICAL HISTORY: Neuro deficit, acute, stroke suspected TECHNIQUE: 5mm slice thickness axial images through the brain were performed in bone and soft tissue windows in the absence of intravenous contrast.  This exam was performed according to our departmental dose-optimization program which includes use of Automated Exposure Control, adjustment of the mA and/or kV according to patient size and/or use of iterative reconstruction technique. COMPARISON: July 13, 2023. FINDINGS: The brain parenchyma appears unremarkable. There is no intra-axial or extra-axial bleed seen. There is no mass or mass effect. The ventricles are normal in  size shape and configuration. The orbital contents appear unremarkable. The visualized paranasal sinuses and mastoid air cells are patent. No fracture is present. IMPRESSION: No acute intracranial abnormality identified. If there is continuing clinical concern about acute infarction, MRI can BE obtained. Electronically signed by:  Moises Avila MD, MBA  7/27/2023 10:10 PM CDT Workstation: [id number]      MRI Lumbar Spine Without Contrast    Result Date: 7/27/2023  MR LUMBAR SPINE WITHOUT IV CONTRAST COMPARISON: None Additional pertinent history: Low back pain with prior surgery. Technique: Multiplanar multisequence lumbar spine MRI was performed without gadolinium enhancement. FINDINGS: Vertebral body heights and alignment: Negative. Vertebral marrow signal: Negative. Distal thoracic cord and conus: Negative.  The conus ends at L1. Surrounding soft tissues: Negative. Inspection of the disc spaces reveal the following: L5-S1: Negative. L4-L5: Negative. L3-L4: Negative. L2-L3: Negative. L1-L2: Negative. T12-L1: Negative. IMPRESSION:   Normal lumbar spine MRI without contrast. Electronically signed by:  Juan Victoria MD  7/27/2023 9:44 PM CDT Workstation: [id number]

## 2023-07-28 NOTE — ASSESSMENT & PLAN NOTE
Recent hospital admission for same  Workup grossly unrevealing, LP performed, CMV IgG positive but IgM negative, PCR negative, HIV negative  Continue Augmentin and doxycycline to complete course of antibiotic

## 2023-07-28 NOTE — PHARMACY MED REC
"              .      Admission Medication History     The home medication history was taken by Sonia Bullock.    You may go to "Admission" then "Reconcile Home Medications" tabs to review and/or act upon these items.     The home medication list has been updated by the Pharmacy department.   Please read ALL comments highlighted in yellow.   Please address this information as you see fit.    Feel free to contact us if you have any questions or require assistance.      The medications listed below were removed from the home medication list. Please reorder if appropriate:  Patient reports no longer taking the following medication(s):  Tylenol  Ibuprofen      Medications listed below were obtained from: Patient/family and Analytic software- [facility]  No current facility-administered medications on file prior to encounter.     Current Outpatient Medications on File Prior to Encounter   Medication Sig Dispense Refill    amoxicillin-clavulanate 875-125mg (AUGMENTIN) 875-125 mg per tablet Take 1 tablet by mouth every 12 (twelve) hours. for 14 days 28 tablet 0    doxycycline (VIBRAMYCIN) 100 MG Cap Take 1 capsule (100 mg total) by mouth 2 (two) times daily. for 14 days 28 capsule 0    Lactobacillus acidophilus (PROBIOTIC) 10 billion cell Cap Take 1 capsule by mouth 2 (two) times a day.      HYDROcodone-acetaminophen (NORCO) 7.5-325 mg per tablet Take 1 tablet by mouth every 4 to 6 hours as needed.      [DISCONTINUED] acetaminophen (TYLENOL EXTRA STRENGTH) 500 MG tablet Take 1,000 mg by mouth every 6 (six) hours as needed for Pain.      [DISCONTINUED] ibuprofen (ADVIL,MOTRIN) 200 MG tablet Take 400 mg by mouth every 6 (six) hours as needed for Pain.         Potential issues to be addressed PRIOR TO DISCHARGE  Patient reported not taking the following medications: (Hydrocodone). These medications remain on the home medication list. Please address accordingly.     Sonia Bullock  EXT [id number]      "

## 2023-07-28 NOTE — ED PROVIDER NOTES
Encounter Date: 7/27/2023       History     Chief Complaint   Patient presents with    Back Pain     Recent LP for meningitis workup, which was negative  Acute low back pain tonight,   Weakness to BLE    Numbness     Lt lower facial numbness x 45min, no droop     27-year-old female no significant medical history presents to the ER low back pain and leg numbness and weakness that began tonight while she was cooking dinner.  She was admitted here 2 weeks ago worked up for meningitis and had a lumbar puncture but was discharged and had been doing well until tonight.  She also reports numbness and weakness in her arms and some facial tingling on the left side.  Symptoms are all bilateral in the arms and legs.  No other complaints.  No headache no neck pain.    The history is provided by the patient and the spouse.   Review of patient's allergies indicates:  No Known Allergies  Past Medical History:   Diagnosis Date    Pharyngitis      Past Surgical History:   Procedure Laterality Date    neck abscess      No past surgical history       Family History   Problem Relation Age of Onset    Hypertension Mother     Cancer Maternal Grandmother         breast    Hypertension Maternal Grandfather      Social History     Tobacco Use    Smoking status: Former     Types: Cigarettes    Smokeless tobacco: Never    Tobacco comments:     Recent ex smoker 1/2 ppd   Substance Use Topics    Alcohol use: Yes     Alcohol/week: 5.0 standard drinks     Types: 4 Glasses of wine, 1 Shots of liquor per week     Comment: socially    Drug use: Never     Review of Systems   Respiratory: Negative.     Cardiovascular: Negative.    Gastrointestinal: Negative.    Neurological:  Positive for weakness and numbness.   All other systems reviewed and are negative.    Physical Exam     Initial Vitals [07/27/23 2015]   BP Pulse Resp Temp SpO2   (!) 74/40 (!) 115 20 98.1 °F (36.7 °C) 95 %      MAP       --         Physical Exam    Nursing note and vitals  "reviewed.  Constitutional: She appears well-developed and well-nourished. She is not diaphoretic.  Non-toxic appearance. She does not have a sickly appearance. She does not appear ill. No distress.   HENT:   Head: Normocephalic and atraumatic.   Eyes: EOM are normal.   Neck: Neck supple.   Normal range of motion.  Cardiovascular:  Normal rate, regular rhythm and normal heart sounds.     Exam reveals no gallop and no friction rub.       No murmur heard.  Pulmonary/Chest: Breath sounds normal. No respiratory distress. She has no wheezes. She has no rhonchi. She has no rales.   Abdominal: Abdomen is soft. She exhibits no distension. There is no abdominal tenderness.   Musculoskeletal:         General: Normal range of motion.      Cervical back: Normal range of motion and neck supple.     Neurological: She is alert and oriented to person, place, and time.   Decreased sensation to pinprick throughout arms and legs and also on trunk.  Normal  strength bilaterally.  No pronator drift with arms but states her arms feel "a little" weak.  She is able to get both legs off of the bed and change positions easily but has weak lower extremity plantar flexion dorsiflexion and EHL extension. Generalized faint trembling.   Skin: Skin is warm and dry.   Psychiatric: She has a normal mood and affect. Her behavior is normal. Judgment and thought content normal.       ED Course   Procedures  Labs Reviewed   CBC W/ AUTO DIFFERENTIAL - Abnormal; Notable for the following components:       Result Value    RBC 3.94 (*)     Hemoglobin 11.0 (*)     Hematocrit 34.4 (*)     Platelets 622 (*)     Lymph # 6.1 (*)     Gran % 37.7 (*)     Lymph % 54.0 (*)     All other components within normal limits   COMPREHENSIVE METABOLIC PANEL   TSH   VITAMIN B12   FOLATE   FOLATE   TSH   VITAMIN B12   IRON AND TIBC   FERRITIN   FERRITIN   IRON AND TIBC   URINALYSIS, REFLEX TO URINE CULTURE   POCT GLUCOSE          Imaging Results              CT Head " Without Contrast (Final result)  Result time 07/27/23 22:10:40      Final result by Moises Oakley MD (07/27/23 22:10:40)                   Narrative:    EXAM DESCRIPTION:    CT HEAD WITHOUT CONTRAST    CLINICAL HISTORY:  Neuro deficit, acute, stroke suspected    TECHNIQUE:    5mm slice thickness axial images through the brain were performed in bone and soft tissue windows in the absence of intravenous contrast.  This exam was performed according to our departmental dose-optimization program which includes use of Automated Exposure Control, adjustment of the mA and/or kV according to patient size and/or use of iterative reconstruction technique.    COMPARISON:    July 13, 2023.    FINDINGS:    The brain parenchyma appears unremarkable. There is no intra-axial or extra-axial bleed seen. There is no mass or mass effect. The ventricles are normal in size shape and configuration. The orbital contents appear unremarkable.    The visualized paranasal sinuses and mastoid air cells are patent. No fracture is present.    IMPRESSION:    No acute intracranial abnormality identified. If there is continuing clinical concern about acute infarction, MRI can BE obtained.    Electronically signed by:  Moises Avila MD, MAULIK  7/27/2023 10:10 PM CDT Workstation: [id number]                                     MRI Lumbar Spine Without Contrast (Final result)  Result time 07/27/23 21:44:29      Final result by Juan Victoria MD (07/27/23 21:44:29)                   Narrative:    MR LUMBAR SPINE WITHOUT IV CONTRAST    COMPARISON: None    Additional pertinent history: Low back pain with prior surgery.    Technique: Multiplanar multisequence lumbar spine MRI was performed without gadolinium enhancement.    FINDINGS:    Vertebral body heights and alignment: Negative.    Vertebral marrow signal: Negative.    Distal thoracic cord and conus: Negative.  The conus ends at L1.    Surrounding soft tissues: Negative.    Inspection of the disc  spaces reveal the following:    L5-S1: Negative.    L4-L5: Negative.    L3-L4: Negative.    L2-L3: Negative.    L1-L2: Negative.    T12-L1: Negative.      IMPRESSION:   Normal lumbar spine MRI without contrast.    Electronically signed by:  Juan Victoria MD  7/27/2023 9:44 PM CDT Workstation: [id number]                                     Medications   lactated ringers infusion ( Intravenous New Bag 7/28/23 0339)   amoxicillin-clavulanate 875-125mg per tablet 1 tablet (1 tablet Oral Given 7/28/23 0338)   doxycycline capsule 100 mg (100 mg Oral Given 7/28/23 0338)   sodium chloride 0.9% flush 10 mL (has no administration in time range)   glucose chewable tablet 16 g (has no administration in time range)   glucose chewable tablet 24 g (has no administration in time range)   dextrose 50% injection 12.5 g (has no administration in time range)   dextrose 50% injection 25 g (has no administration in time range)   glucagon (human recombinant) injection 1 mg (has no administration in time range)   acetaminophen tablet 650 mg (has no administration in time range)   ibuprofen tablet 600 mg (has no administration in time range)   lactated ringers bolus 1,000 mL (0 mLs Intravenous Stopped 7/28/23 0304)   ketorolac injection 9.999 mg (9.999 mg Intravenous Given 7/28/23 0047)     Medical Decision Making:   History:   Old Medical Records: I decided to obtain old medical records.  Old Records Summarized: records from previous admission(s).       <> Summary of Records: Previous admission reviewed, lumbar puncture that time unremarkable  Initial Assessment:   Back pain, leg weakness began this afternoon.  Also subjectively weak arms diffuse numbness.  Differential Diagnosis:   Spinal hematoma, Guillain-[place]mark, transverse myelitis, conversion disorder  Clinical Tests:   Lab Tests: Ordered and Reviewed  Radiological Study: Ordered  ED Management:  Physical exam, labs, MRI and CT scan ordered  Other:   I have discussed this case with  another health care provider.       <> Summary of the Discussion: 2142 Patient care transferred to Dr Mayberry at shift change. I discussed the relevant history, physical exam, laboratory findings, radiological findings and anticipated disposition plan. On coming staff to formally complete visit disposition, review any pending laboratory/radiological results and to addend treatment plan as necessary.     [EF]    27-year-old female recently admitted here for rule out meningitis presents to the emergency room with low back pain and leg weakness that began this afternoon.  She also reports upper extremity weakness which is subjective.  Also facial tingling.  On exam she has diffuse decreased sensation to pinprick.  Weak plantar flexion and dorsiflexion and hip flexion on exam.  MRI of the lumbar spine has been ordered CT of the head has been ordered.  Signed over to Dr. Mayberry.  Even if imaging is negative and deficits persist she would likely need to be admitted.  I think very unlikely to represent CVA with bilateral symptoms so no stroke alert activated.           ED Course as of 07/28/23 0715   u Jul 27, 2023 2023 Temp: 98.1 °F (36.7 °C) [EF]   2023 Pulse(!): 115 [EF]   2023 Resp: 20 [EF]   2023 SpO2: 95 % [EF]   2027 BP(!): 74/40 [EF]   2027 Temp: 98.1 °F (36.7 °C) [EF]   2027 Pulse(!): 115 [EF]   2027 Resp: 20 [EF]   2027 SpO2: 95 % [EF]   2037 Sinus tachycardia 112 beats per minute normal axis no ST elevation or depression or T-wave inversion independently interpreted [EF]   2054 POC Glucose: 107 [EF]   2100 27-year-old recent lumbar puncture for possible meningitis presents to the ER with back pain and leg weakness but also reports bilateral arm subjective numbness and weakness and facial tingling.  We will obtain an MRI of the lumbar spine and if there is no spinal hematoma patient likely will need to be admitted for Neurology consultation. [EF]   2106 WBC: 11.25 [EF]   2106 Hemoglobin(!): 11.0 [EF]   2106  Platelets(!): 622 [EF]   2140 Signed over to dr mayberry [EF]   2142 Patient care transferred to Dr Mayberry at shift change. I discussed the relevant history, physical exam, laboratory findings, radiological findings and anticipated disposition plan. On coming staff to formally complete visit disposition, review any pending laboratory/radiological results and to addend treatment plan as necessary.     [EF]      ED Course User Index  [EF] Ihsan Maxwell MD                 Clinical Impression:   Final diagnoses:  [I95.9] Hypotension        ED Disposition Condition    Observation Stable                Ihsan Maxwell MD  07/28/23 0716

## 2024-02-08 ENCOUNTER — OFFICE VISIT (OUTPATIENT)
Dept: FAMILY MEDICINE | Facility: CLINIC | Age: 29
End: 2024-02-08
Payer: COMMERCIAL

## 2024-02-08 VITALS
BODY MASS INDEX: 19.75 KG/M2 | WEIGHT: 107.31 LBS | HEART RATE: 52 BPM | OXYGEN SATURATION: 97 % | SYSTOLIC BLOOD PRESSURE: 110 MMHG | HEIGHT: 62 IN | DIASTOLIC BLOOD PRESSURE: 74 MMHG

## 2024-02-08 DIAGNOSIS — Z86.2 HISTORY OF ANEMIA: ICD-10-CM

## 2024-02-08 DIAGNOSIS — Z00.00 ROUTINE HEALTH MAINTENANCE: Primary | ICD-10-CM

## 2024-02-08 DIAGNOSIS — Z12.4 CERVICAL CANCER SCREENING: ICD-10-CM

## 2024-02-08 PROCEDURE — 3074F SYST BP LT 130 MM HG: CPT | Mod: CPTII,S$GLB,, | Performed by: NURSE PRACTITIONER

## 2024-02-08 PROCEDURE — 1159F MED LIST DOCD IN RCRD: CPT | Mod: CPTII,S$GLB,, | Performed by: NURSE PRACTITIONER

## 2024-02-08 PROCEDURE — 3008F BODY MASS INDEX DOCD: CPT | Mod: CPTII,S$GLB,, | Performed by: NURSE PRACTITIONER

## 2024-02-08 PROCEDURE — 1160F RVW MEDS BY RX/DR IN RCRD: CPT | Mod: CPTII,S$GLB,, | Performed by: NURSE PRACTITIONER

## 2024-02-08 PROCEDURE — 3078F DIAST BP <80 MM HG: CPT | Mod: CPTII,S$GLB,, | Performed by: NURSE PRACTITIONER

## 2024-02-08 PROCEDURE — 99385 PREV VISIT NEW AGE 18-39: CPT | Mod: S$GLB,,, | Performed by: NURSE PRACTITIONER

## 2024-02-08 PROCEDURE — 99999 PR PBB SHADOW E&M-EST. PATIENT-LVL IV: CPT | Mod: PBBFAC,,, | Performed by: NURSE PRACTITIONER

## 2024-02-08 NOTE — PROGRESS NOTES
SUBJECTIVE:      Patient ID: Kade Moreno is a 28 y.o. female.    Chief Complaint: Establish Care (New Patient - establishing with a new PCP - general check-up)    Presents to establish care, no regular PCP prior.  No physical complaints this visit.    , no children, former smoker, social drinker, no drug use.  Just finished school and is currently not working.    Discussed outstanding health maintenance - states she had a tetanus shot last year when she went to the ED for a cut on her finger        Past Surgical History:   Procedure Laterality Date    neck abscess  2022    No past surgical history       Family History   Problem Relation Age of Onset    Hypertension Mother     Hyperlipidemia Mother     Breast cancer Maternal Grandmother     Hypertension Maternal Grandfather     Hyperlipidemia Maternal Grandfather     Heart disease Maternal Grandfather       Social History     Socioeconomic History    Marital status:     Number of children: 0   Tobacco Use    Smoking status: Former     Types: Cigarettes    Smokeless tobacco: Never    Tobacco comments:     Recent ex smoker 1/2 ppd   Substance and Sexual Activity    Alcohol use: Yes     Alcohol/week: 5.0 standard drinks of alcohol     Types: 4 Glasses of wine, 1 Shots of liquor per week     Comment: socially    Drug use: Never    Sexual activity: Yes     Partners: Male     Birth control/protection: Condom     Social Determinants of Health     Financial Resource Strain: Low Risk  (2/8/2024)    Overall Financial Resource Strain (CARDIA)     Difficulty of Paying Living Expenses: Not hard at all   Food Insecurity: No Food Insecurity (2/8/2024)    Hunger Vital Sign     Worried About Running Out of Food in the Last Year: Never true     Ran Out of Food in the Last Year: Never true   Transportation Needs: No Transportation Needs (2/8/2024)    PRAPARE - Transportation     Lack of Transportation (Medical): No     Lack of Transportation (Non-Medical):  No   Physical Activity: Sufficiently Active (2/8/2024)    Exercise Vital Sign     Days of Exercise per Week: 4 days     Minutes of Exercise per Session: 90 min   Stress: Stress Concern Present (2/8/2024)    [employer] - Occupational Stress Questionnaire     Feeling of Stress : To some extent   Social Connections: Socially Isolated (2/8/2024)    Social Connection and Isolation Panel [NHANES]     Frequency of Communication with Friends and Family: Never     Frequency of Social Gatherings with Friends and Family: Never     Attends Gnosticist Services: Never     Active Member of Clubs or Organizations: No     Attends Club or Organization Meetings: Never     Marital Status:    Housing Stability: Low Risk  (2/8/2024)    Housing Stability Vital Sign     Unable to Pay for Housing in the Last Year: No     Number of Places Lived in the Last Year: 2     Unstable Housing in the Last Year: No     Current Outpatient Medications   Medication Sig Dispense Refill    Lactobacillus acidophilus (PROBIOTIC) 10 billion cell Cap Take 1 capsule by mouth 2 (two) times a day.       No current facility-administered medications for this visit.     Review of patient's allergies indicates:  No Known Allergies   Past Medical History:   Diagnosis Date    Pharyngitis      Past Surgical History:   Procedure Laterality Date    neck abscess  2022    No past surgical history         Review of Systems   Constitutional:  Positive for fatigue. Negative for activity change, appetite change and unexpected weight change.   HENT:  Negative for congestion, ear pain, hearing loss, postnasal drip, sinus pressure, sinus pain, sneezing, sore throat and trouble swallowing.    Eyes:  Negative for photophobia, pain and discharge.   Respiratory:  Negative for cough, chest tightness, shortness of breath and wheezing.    Cardiovascular:  Negative for chest pain, palpitations and leg swelling.   Gastrointestinal:  Negative for  "abdominal distention, abdominal pain, blood in stool, constipation, diarrhea, nausea and vomiting.   Endocrine: Negative for cold intolerance, heat intolerance, polydipsia and polyuria.   Genitourinary:  Negative for difficulty urinating, dysuria, flank pain, frequency, hematuria, pelvic pain and urgency.   Musculoskeletal:  Negative for arthralgias, back pain, joint swelling, myalgias and neck pain.   Skin:  Negative for pallor.   Allergic/Immunologic: Negative for environmental allergies and food allergies.   Neurological:  Negative for dizziness, weakness, light-headedness, numbness and headaches.   Hematological:  Does not bruise/bleed easily.   Psychiatric/Behavioral:  Negative for agitation, confusion, decreased concentration, dysphoric mood and sleep disturbance. The patient is not nervous/anxious.       OBJECTIVE:      Vitals:    02/08/24 1259   BP: 110/74   BP Location: Right arm   Patient Position: Sitting   BP Method: Medium (Manual)   Pulse: (!) 52   SpO2: 97%   Weight: 48.7 kg (107 lb 4.8 oz)   Height: 5' 2" (1.575 m)     Physical Exam  Vitals and nursing note reviewed.   Constitutional:       General: She is not in acute distress.     Appearance: Normal appearance. She is well-developed and normal weight.   HENT:      Head: Normocephalic and atraumatic.      Right Ear: Hearing normal.      Left Ear: Hearing normal.      Nose: Nose normal. No rhinorrhea.   Eyes:      General: Lids are normal.         Right eye: No discharge.         Left eye: No discharge.      Conjunctiva/sclera: Conjunctivae normal.      Right eye: Right conjunctiva is not injected.      Left eye: Left conjunctiva is not injected.      Pupils: Pupils are equal, round, and reactive to light. Pupils are equal.      Right eye: Pupil is round and reactive.      Left eye: Pupil is round and reactive.   Neck:      Thyroid: No thyromegaly.      Vascular: No JVD.      Trachea: Trachea normal. No tracheal deviation.   Cardiovascular:      Rate " and Rhythm: Regular rhythm. Bradycardia present.      Pulses:           Radial pulses are 2+ on the right side and 2+ on the left side.        Posterior tibial pulses are 2+ on the right side and 2+ on the left side.      Heart sounds: Normal heart sounds. No murmur heard.     No friction rub. No gallop.   Pulmonary:      Effort: Pulmonary effort is normal. No respiratory distress.      Breath sounds: Normal breath sounds. No stridor. No decreased breath sounds, wheezing, rhonchi or rales.   Abdominal:      General: Bowel sounds are normal. There is no distension.      Palpations: Abdomen is soft. Abdomen is not rigid.      Tenderness: There is no abdominal tenderness. There is no guarding.   Musculoskeletal:         General: Normal range of motion.      Cervical back: Normal range of motion and neck supple.   Lymphadenopathy:      Cervical: No cervical adenopathy.   Skin:     General: Skin is warm and dry.      Capillary Refill: Capillary refill takes less than 2 seconds.      Coloration: Skin is not pale.      Findings: No lesion or rash.   Neurological:      Mental Status: She is alert and oriented to person, place, and time.      GCS: GCS eye subscore is 4. GCS verbal subscore is 5. GCS motor subscore is 6.      Cranial Nerves: Cranial nerves 2-12 are intact.      Sensory: Sensation is intact.      Motor: Motor function is intact. No atrophy.      Coordination: Coordination is intact. Coordination normal.      Gait: Gait is intact. Gait normal.   Psychiatric:         Attention and Perception: Attention and perception normal. She is attentive.         Mood and Affect: Mood and affect normal.         Speech: Speech normal.         Behavior: Behavior normal.         Thought Content: Thought content normal.         Cognition and Memory: Cognition and memory normal.         Judgment: Judgment normal.        Assessment:       1. Routine health maintenance    2. History of anemia    3. Cervical cancer screening         Plan:       Routine health maintenance  -     CBC Auto Differential; Future; Expected date: 02/15/2024  -     Comprehensive Metabolic Panel; Future; Expected date: 02/08/2024  -     Lipid Panel; Future; Expected date: 02/08/2024  -     TSH; Future; Expected date: 02/08/2024    History of anemia  -     CBC Auto Differential; Future; Expected date: 02/15/2024  -     Ferritin; Future; Expected date: 02/08/2024  -     Iron; Future; Expected date: 02/08/2024  -     Vitamin B12; Future; Expected date: 02/08/2024  -     Folate; Future; Expected date: 02/08/2024    Cervical cancer screening  -     Ambulatory referral/consult to Obstetrics / Gynecology; Future; Expected date: 02/15/2024        Follow up in about 1 year (around 2/8/2025) for well exam.      2/8/2024 CHOLO Richardson, FNP-C

## 2024-02-09 ENCOUNTER — LAB VISIT (OUTPATIENT)
Dept: LAB | Facility: HOSPITAL | Age: 29
End: 2024-02-09
Attending: NURSE PRACTITIONER
Payer: COMMERCIAL

## 2024-02-09 DIAGNOSIS — Z86.2 HISTORY OF ANEMIA: ICD-10-CM

## 2024-02-09 DIAGNOSIS — Z00.00 ROUTINE HEALTH MAINTENANCE: ICD-10-CM

## 2024-02-09 LAB
ALBUMIN SERPL BCP-MCNC: 4.3 G/DL (ref 3.5–5.2)
ALP SERPL-CCNC: 39 U/L (ref 55–135)
ALT SERPL W/O P-5'-P-CCNC: 16 U/L (ref 10–44)
ANION GAP SERPL CALC-SCNC: 11 MMOL/L (ref 8–16)
AST SERPL-CCNC: 32 U/L (ref 10–40)
BASOPHILS # BLD AUTO: 0.02 K/UL (ref 0–0.2)
BASOPHILS NFR BLD: 0.5 % (ref 0–1.9)
BILIRUB SERPL-MCNC: 0.5 MG/DL (ref 0.1–1)
BUN SERPL-MCNC: 10 MG/DL (ref 6–20)
CALCIUM SERPL-MCNC: 10 MG/DL (ref 8.7–10.5)
CHLORIDE SERPL-SCNC: 103 MMOL/L (ref 95–110)
CHOLEST SERPL-MCNC: 188 MG/DL (ref 120–199)
CHOLEST/HDLC SERPL: 3.4 {RATIO} (ref 2–5)
CO2 SERPL-SCNC: 23 MMOL/L (ref 23–29)
CREAT SERPL-MCNC: 0.7 MG/DL (ref 0.5–1.4)
DIFFERENTIAL METHOD BLD: ABNORMAL
EOSINOPHIL # BLD AUTO: 0.1 K/UL (ref 0–0.5)
EOSINOPHIL NFR BLD: 1.8 % (ref 0–8)
ERYTHROCYTE [DISTWIDTH] IN BLOOD BY AUTOMATED COUNT: 12.9 % (ref 11.5–14.5)
EST. GFR  (NO RACE VARIABLE): >60 ML/MIN/1.73 M^2
FERRITIN SERPL-MCNC: 98 NG/ML (ref 20–300)
FOLATE SERPL-MCNC: 14.2 NG/ML (ref 4–24)
GLUCOSE SERPL-MCNC: 85 MG/DL (ref 70–110)
HCT VFR BLD AUTO: 37.5 % (ref 37–48.5)
HDLC SERPL-MCNC: 56 MG/DL (ref 40–75)
HDLC SERPL: 29.8 % (ref 20–50)
HGB BLD-MCNC: 11.8 G/DL (ref 12–16)
IMM GRANULOCYTES # BLD AUTO: 0.01 K/UL (ref 0–0.04)
IMM GRANULOCYTES NFR BLD AUTO: 0.3 % (ref 0–0.5)
IRON SERPL-MCNC: 149 UG/DL (ref 30–160)
LDLC SERPL CALC-MCNC: 120.4 MG/DL (ref 63–159)
LYMPHOCYTES # BLD AUTO: 2.4 K/UL (ref 1–4.8)
LYMPHOCYTES NFR BLD: 63.5 % (ref 18–48)
MCH RBC QN AUTO: 27.2 PG (ref 27–31)
MCHC RBC AUTO-ENTMCNC: 31.5 G/DL (ref 32–36)
MCV RBC AUTO: 86 FL (ref 82–98)
MONOCYTES # BLD AUTO: 0.3 K/UL (ref 0.3–1)
MONOCYTES NFR BLD: 7 % (ref 4–15)
NEUTROPHILS # BLD AUTO: 1 K/UL (ref 1.8–7.7)
NEUTROPHILS NFR BLD: 26.9 % (ref 38–73)
NONHDLC SERPL-MCNC: 132 MG/DL
NRBC BLD-RTO: 0 /100 WBC
PLATELET # BLD AUTO: 284 K/UL (ref 150–450)
PMV BLD AUTO: 10.9 FL (ref 9.2–12.9)
POTASSIUM SERPL-SCNC: 3.9 MMOL/L (ref 3.5–5.1)
PROT SERPL-MCNC: 7.5 G/DL (ref 6–8.4)
RBC # BLD AUTO: 4.34 M/UL (ref 4–5.4)
SODIUM SERPL-SCNC: 137 MMOL/L (ref 136–145)
TRIGL SERPL-MCNC: 58 MG/DL (ref 30–150)
TSH SERPL DL<=0.005 MIU/L-ACNC: 2.29 UIU/ML (ref 0.4–4)
VIT B12 SERPL-MCNC: 1211 PG/ML (ref 210–950)
WBC # BLD AUTO: 3.84 K/UL (ref 3.9–12.7)

## 2024-02-09 PROCEDURE — 82728 ASSAY OF FERRITIN: CPT | Performed by: NURSE PRACTITIONER

## 2024-02-09 PROCEDURE — 82607 VITAMIN B-12: CPT | Performed by: NURSE PRACTITIONER

## 2024-02-09 PROCEDURE — 80053 COMPREHEN METABOLIC PANEL: CPT | Performed by: NURSE PRACTITIONER

## 2024-02-09 PROCEDURE — 82746 ASSAY OF FOLIC ACID SERUM: CPT | Performed by: NURSE PRACTITIONER

## 2024-02-09 PROCEDURE — 85025 COMPLETE CBC W/AUTO DIFF WBC: CPT | Performed by: NURSE PRACTITIONER

## 2024-02-09 PROCEDURE — 84443 ASSAY THYROID STIM HORMONE: CPT | Performed by: NURSE PRACTITIONER

## 2024-02-09 PROCEDURE — 36415 COLL VENOUS BLD VENIPUNCTURE: CPT | Performed by: NURSE PRACTITIONER

## 2024-02-09 PROCEDURE — 80061 LIPID PANEL: CPT | Performed by: NURSE PRACTITIONER

## 2024-02-09 PROCEDURE — 83540 ASSAY OF IRON: CPT | Performed by: NURSE PRACTITIONER

## 2024-02-19 ENCOUNTER — PATIENT OUTREACH (OUTPATIENT)
Dept: ADMINISTRATIVE | Facility: HOSPITAL | Age: 29
End: 2024-02-19
Payer: COMMERCIAL

## 2024-02-19 NOTE — PROGRESS NOTES
Population Health Chart Review & Patient Outreach Details    Outreach Performed: NO    Additional Pop Health Notes:           Updates Requested / Reviewed:      Updated Care Coordination Note, Care Everywhere, , External Sources: [facility], Care Team Updated, and Immunizations Reconciliation Completed or Queried: [place]         Health Maintenance Topics Overdue:    Health Maintenance Due   Topic Date Due    TETANUS VACCINE  Never done    Influenza Vaccine (1) 09/01/2023    COVID-19 Vaccine (2 - 2023-24 season) 09/01/2023         Health Maintenance Topic(s) Outreach Outcomes & Actions Taken:    Cervical Cancer Screening - Outreach Outcomes & Actions Taken  : External Records Uploaded & Care Team Updated if Applicable

## 2024-08-07 ENCOUNTER — OFFICE VISIT (OUTPATIENT)
Dept: OTOLARYNGOLOGY | Facility: CLINIC | Age: 29
End: 2024-08-07
Payer: COMMERCIAL

## 2024-08-07 VITALS
DIASTOLIC BLOOD PRESSURE: 64 MMHG | HEIGHT: 63 IN | SYSTOLIC BLOOD PRESSURE: 103 MMHG | BODY MASS INDEX: 19.84 KG/M2 | WEIGHT: 112 LBS | HEART RATE: 54 BPM

## 2024-08-07 DIAGNOSIS — J35.8 TONSILLITH: Primary | ICD-10-CM

## 2024-08-07 DIAGNOSIS — J35.8 CRYPTIC TONSIL: ICD-10-CM

## 2024-08-07 PROCEDURE — 3074F SYST BP LT 130 MM HG: CPT | Mod: CPTII,S$GLB,, | Performed by: PHYSICIAN ASSISTANT

## 2024-08-07 PROCEDURE — 99203 OFFICE O/P NEW LOW 30 MIN: CPT | Mod: S$GLB,,, | Performed by: PHYSICIAN ASSISTANT

## 2024-08-07 PROCEDURE — 3008F BODY MASS INDEX DOCD: CPT | Mod: CPTII,S$GLB,, | Performed by: PHYSICIAN ASSISTANT

## 2024-08-07 PROCEDURE — 3078F DIAST BP <80 MM HG: CPT | Mod: CPTII,S$GLB,, | Performed by: PHYSICIAN ASSISTANT

## 2024-08-07 PROCEDURE — 1160F RVW MEDS BY RX/DR IN RCRD: CPT | Mod: CPTII,S$GLB,, | Performed by: PHYSICIAN ASSISTANT

## 2024-08-07 PROCEDURE — 99999 PR PBB SHADOW E&M-EST. PATIENT-LVL III: CPT | Mod: PBBFAC,,, | Performed by: PHYSICIAN ASSISTANT

## 2024-08-07 PROCEDURE — 1159F MED LIST DOCD IN RCRD: CPT | Mod: CPTII,S$GLB,, | Performed by: PHYSICIAN ASSISTANT

## 2024-08-07 RX ORDER — METHYLPREDNISOLONE 4 MG/1
4 TABLET ORAL DAILY
Qty: 21 EACH | Refills: 0 | Status: SHIPPED | OUTPATIENT
Start: 2024-08-07 | End: 2025-08-07

## 2024-08-07 NOTE — PATIENT INSTRUCTIONS
"Mix 1tsp of salt into 8oz glass of water and swish, gargle and spit three times a day for 1 week after breakfast, lunch and dinner. You may also do this as maintenance to help lessen recurrence of tonsil stones. You may try dental water pick to remove tonsil stones if manual removal is causing too much irritation.     Discussed that the patient has tonsil stones, or tonsilloliths.  Some patients complain of small stones on their tongue or tonsils when they cough that have a foul odor. These stones are "tonsilloliths" that form in crypts of the tonsils. They are relatively common, occurring in perhaps 7 percent of the population at one time or another.  Although the stones have a foul smell, they do not always cause bad breath. I would recommend treatment with hypertonic saline gargles and a water pick, I would not recommend manual extraction of the tonsil stones with sharp objects or fingernails, as this may traumatize the area.  Gentle pressure with a qtip may be attempted, but not forcefully.    It is also important to maintain meticulous oral hygiene (frequent toothbrushing, gargling, flossing, and dental cleanings) as well as adequate hydration.          "

## 2024-08-07 NOTE — PROGRESS NOTES
[facility]    Subjective:      Patient: Kade Moreno Patient PCP: Ector Kapadia APRN,EMPERATRIZ-C         :  1995     Sex:  female      MRN:  [id number]          Date of Visit: 2024      Chief Complaint: Tonsil stones    Patient ID: Kade Moreno is a 28 y.o. female who presents to clinic for evaluation of tonsil stones. She will occasionally get irritation from tonsil stones. She has not had recent diagnosed strep/tonsillar/pharyngeal infection. She does have mild irritation today. No fever/chills or dysphagia/odynphagia.       Past Medical History  She has a past medical history of Pharyngitis.    Family History  Her family history includes Breast cancer in her maternal grandmother; Heart disease in her maternal grandfather; Hyperlipidemia in her maternal grandfather and mother; Hypertension in her maternal grandfather and mother.    Past Surgical History:   Procedure Laterality Date    neck abscess      No past surgical history       Social History     Tobacco Use    Smoking status: Former     Types: Cigarettes    Smokeless tobacco: Never    Tobacco comments:     Recent ex smoker 1/2 ppd   Substance and Sexual Activity    Alcohol use: Yes     Alcohol/week: 5.0 standard drinks of alcohol     Types: 4 Glasses of wine, 1 Shots of liquor per week     Comment: socially    Drug use: Never    Sexual activity: Yes     Partners: Male     Birth control/protection: Condom     Medications  She has a current medication list which includes the following prescription(s): probiotic.    Review of patient's allergies indicates:  No Known Allergies  All medications, allergies, and past history have been reviewed.    Objective:      Vitals:      2023    10:39 AM 2023     8:15 PM 2024    12:59 PM   Vitals - 1 value per visit   SYSTOLIC 107 74 110   DIASTOLIC 63 40 74   Pulse 112 115 52   Temp 98.9 °F (37.2 °C) 98.1 °F (36.7 °C)    Resp 18 20    SPO2 97 % 95 % 97 %   Weight (lb) 110  "110 107.3   Weight (kg) 49.896 49.896 48.671   Height 5' 2" (1.575 m) 5' 2" (1.575 m) 5' 2" (1.575 m)   BMI (Calculated) 20.1 20.1 19.6   Pain Score   Zero       There is no height or weight on file to calculate BSA.  Physical Exam  Constitutional:       General: She is not in acute distress.     Appearance: Normal appearance. She is not ill-appearing.   HENT:      Head: Normocephalic and atraumatic.      Right Ear: Tympanic membrane, ear canal and external ear normal.      Left Ear: Tympanic membrane, ear canal and external ear normal.      Nose: Nose normal.      Mouth/Throat:      Lips: Pink. No lesions.      Mouth: Mucous membranes are moist. No oral lesions.      Tongue: No lesions.      Palate: No lesions.      Pharynx: Oropharynx is clear. Uvula midline. No pharyngeal swelling, oropharyngeal exudate, posterior oropharyngeal erythema or uvula swelling.      Comments: [place] tonsils between 1-2+ bilaterally with tonsillar crypts. Mild tonsillar erythema without white exudate or cervical lymphadenopathy.   Eyes:      General:         Right eye: No discharge.         Left eye: No discharge.      Extraocular Movements: Extraocular movements intact.      Conjunctiva/sclera: Conjunctivae normal.   Pulmonary:      Effort: Pulmonary effort is normal.   Neurological:      General: No focal deficit present.      Mental Status: She is alert and oriented to person, place, and time. Mental status is at baseline.   Psychiatric:         Mood and Affect: Mood normal.         Behavior: Behavior normal.         Thought Content: Thought content normal.         Judgment: Judgment normal.       Labs:  WBC   Date Value Ref Range Status   02/09/2024 3.84 (L) 3.90 - 12.70 K/uL Final     Eosinophil %   Date Value Ref Range Status   02/09/2024 1.8 0.0 - 8.0 % Final     Eos #   Date Value Ref Range Status   02/09/2024 0.1 0.0 - 0.5 K/uL Final     Platelets   Date Value Ref Range Status   02/09/2024 284 150 - 450 K/uL Final     Glucose " "  Date Value Ref Range Status   02/09/2024 85 70 - 110 mg/dL Final     All lab results, imaging results, and data have been reviewed.    Assessment:        ICD-10-CM ICD-9-CM   1. Tonsillith  J35.8 474.8   2. Cryptic tonsil  J35.8 474.8            Plan:      No signs of acute tonsillitis/pharyngitis today in office. Mild tonsillar erythema secondary to recent tonsil stones. Start methylPREDNISolone (MEDROL DOSEPACK) 4 mg tablet; Take 1 tablet (4 mg total) by mouth once daily. use as directed. Discussed that the patient has tonsil stones, or tonsilloliths.  Some patients complain of small stones on their tongue or tonsils when they cough that have a foul odor. These stones are "tonsilloliths" that form in crypts of the tonsils. They are relatively common, occurring in perhaps 7 percent of the population at one time or another.  Although the stones have a foul smell, they do not always cause bad breath. I would recommend treatment with hypertonic saline gargles and a water pick, I would not recommend manual extraction of the tonsil stones with sharp objects or fingernails, as this may traumatize the area.  Gentle pressure with a qtip may be attempted, but not forcefully. Mix 1tsp of salt into 8oz glass of water and swish, gargle and spit three times a day for 1 week after breakfast, lunch and dinner. You may also do this as maintenance to help lessen recurrence of tonsil stones. You may try dental water pick to remove tonsil stones if manual removal is causing too much irritation. Follow up if symptoms persist/worsen or as needed for ENT issues/concerns.     "

## 2024-10-19 NOTE — Clinical Note
Diagnosis: Viral meningitis [200877]   Future Attending Provider: RAFAELA CASTRO [[id number]]   Place in Observation: [facility] [[id number]]   Admitting Provider:: RAFAELA CASTRO [[id number]]  
English

## 2024-10-21 ENCOUNTER — TELEPHONE (OUTPATIENT)
Dept: OTOLARYNGOLOGY | Facility: CLINIC | Age: 29
End: 2024-10-21
Payer: COMMERCIAL

## 2024-10-21 NOTE — TELEPHONE ENCOUNTER
Left message on voicemail for pt. Apologized and left message that Mr. Frederick PA-C will not be in clinic today and that we need to reschedule. Will also send [facility] message. Thanks, Jo Ann

## 2024-10-24 ENCOUNTER — TELEPHONE (OUTPATIENT)
Dept: OTOLARYNGOLOGY | Facility: CLINIC | Age: 29
End: 2024-10-24
Payer: COMMERCIAL

## 2024-10-24 NOTE — TELEPHONE ENCOUNTER
Patient called back via call center and  ENT appt was r/s to 10/31 @ 1320 w/Dr. Cuevas.  Encounter signed/closed.

## 2024-10-24 NOTE — TELEPHONE ENCOUNTER
1st attempt to contact pt regarding scheduled ENT appt on 10/29 w/CONSTANCE Mack for call back; advised that appt will need to be r/s to see different provider (preferably ENT MD) since visit is to discuss a surgical procedure.  Pt portal msg to be sent to pt due to failed call attempt.

## 2024-10-31 ENCOUNTER — OFFICE VISIT (OUTPATIENT)
Dept: OTOLARYNGOLOGY | Facility: CLINIC | Age: 29
End: 2024-10-31
Payer: COMMERCIAL

## 2024-10-31 VITALS
HEIGHT: 63 IN | HEART RATE: 59 BPM | BODY MASS INDEX: 19.64 KG/M2 | DIASTOLIC BLOOD PRESSURE: 59 MMHG | SYSTOLIC BLOOD PRESSURE: 104 MMHG | WEIGHT: 110.88 LBS

## 2024-10-31 DIAGNOSIS — Z87.09 HISTORY OF PERITONSILLAR ABSCESS: Primary | ICD-10-CM

## 2024-10-31 DIAGNOSIS — J35.8 CRYPTIC TONSIL: ICD-10-CM

## 2024-10-31 DIAGNOSIS — J35.8 TONSILLITH: ICD-10-CM

## 2024-10-31 PROCEDURE — 99999 PR PBB SHADOW E&M-EST. PATIENT-LVL III: CPT | Mod: PBBFAC,,, | Performed by: OTOLARYNGOLOGY

## 2024-10-31 RX ORDER — SOD CHLOR,BICARB/SQUEEZ BOTTLE
1 PACKET, WITH RINSE DEVICE NASAL 2 TIMES DAILY
Start: 2024-10-31

## 2024-10-31 RX ORDER — FLUTICASONE PROPIONATE 50 MCG
1 SPRAY, SUSPENSION (ML) NASAL 2 TIMES DAILY
Qty: 15.8 ML | Refills: 5 | Status: SHIPPED | OUTPATIENT
Start: 2024-10-31

## 2025-01-15 ENCOUNTER — OFFICE VISIT (OUTPATIENT)
Dept: OTOLARYNGOLOGY | Facility: CLINIC | Age: 30
End: 2025-01-15
Payer: COMMERCIAL

## 2025-01-15 VITALS
WEIGHT: 120.38 LBS | HEIGHT: 63 IN | HEART RATE: 55 BPM | BODY MASS INDEX: 21.33 KG/M2 | DIASTOLIC BLOOD PRESSURE: 55 MMHG | SYSTOLIC BLOOD PRESSURE: 115 MMHG

## 2025-01-15 DIAGNOSIS — Z87.09 HISTORY OF PERITONSILLAR ABSCESS: Primary | ICD-10-CM

## 2025-01-15 DIAGNOSIS — J35.8 CRYPTIC TONSIL: ICD-10-CM

## 2025-01-15 DIAGNOSIS — J35.8 TONSILLITH: ICD-10-CM

## 2025-01-15 PROCEDURE — 1159F MED LIST DOCD IN RCRD: CPT | Mod: CPTII,S$GLB,, | Performed by: OTOLARYNGOLOGY

## 2025-01-15 PROCEDURE — 3074F SYST BP LT 130 MM HG: CPT | Mod: CPTII,S$GLB,, | Performed by: OTOLARYNGOLOGY

## 2025-01-15 PROCEDURE — 3008F BODY MASS INDEX DOCD: CPT | Mod: CPTII,S$GLB,, | Performed by: OTOLARYNGOLOGY

## 2025-01-15 PROCEDURE — 1160F RVW MEDS BY RX/DR IN RCRD: CPT | Mod: CPTII,S$GLB,, | Performed by: OTOLARYNGOLOGY

## 2025-01-15 PROCEDURE — 99999 PR PBB SHADOW E&M-EST. PATIENT-LVL IV: CPT | Mod: PBBFAC,,, | Performed by: OTOLARYNGOLOGY

## 2025-01-15 PROCEDURE — 3078F DIAST BP <80 MM HG: CPT | Mod: CPTII,S$GLB,, | Performed by: OTOLARYNGOLOGY

## 2025-01-15 PROCEDURE — 99214 OFFICE O/P EST MOD 30 MIN: CPT | Mod: S$GLB,,, | Performed by: OTOLARYNGOLOGY

## 2025-01-15 RX ORDER — DEXAMETHASONE 4 MG/1
TABLET ORAL
Qty: 3 TABLET | Refills: 0 | Status: SHIPPED | OUTPATIENT
Start: 2025-01-18 | End: 2025-01-18

## 2025-01-15 NOTE — H&P (VIEW-ONLY)
[facility]    Subjective:      Patient: Kade Moreno Patient PCP: Ector Kapadia APRN,ALISSONP-C         :  1995     Sex:  female      MRN:  [id number]          Date of Visit: 01/15/2025      Chief Complaint: Follow-up (Follow up visit to schedule tonsillectomy. )      Patient ID: Kade Moreno is a 29 y.o. female     01/15/2025 scheduled follow up visit patient seen 2-1/2 months ago with history of prior peritonsillar abscess and some chronic irritative throat symptoms but no repeat peritonsillar infection who is interested in proceeding with tonsillectomy given the chronic nature of her tonsils stones and symptoms but wanted to wait until after the holidays.  She returns today with ongoing chronic irritative symptoms without any recurrent infection.  Symptoms are not controlled with nasal allergy care gargling and stone maintenance.  She continues to have bad breath and irritation in the tonsils.    10/31/2024 initial patient consultation Patient is a previous smoker current vape user with a past medical history of prior peritonsillar abscess (1 abscess on the right drain twice in 2 weeks) and anemia (hemoglobin 11.8 in February) self-referred for discussion of possible benefits of tonsillectomy.  Seen by Mr. Mack and August with chronic irritative symptoms of 1 to 2 +tonsils with some cryptic changes.  No repeat peritonsillar infection.  No significant subjective allergy or reflux symptoms.    Has some mouth breathing at night.  It has not tried nasal steroids or rinses.  Uses gargles.  Water pick was too dramatic.  Uses a syringe and Q-tips to keep the tonsils cleared of stones.  She has become obsessed with a tonsil stones.  She is worried about halitosis.  Takes good care of her mouth and teeth.    Labs:  WBC   Date Value Ref Range Status   2024 3.84 (L) 3.90 - 12.70 K/uL Final     Hemoglobin   Date Value Ref Range Status   2024 11.8 (L) 12.0 - 16.0 g/dL  Final     Platelets   Date Value Ref Range Status   02/09/2024 284 150 - 450 K/uL Final     Creatinine   Date Value Ref Range Status   02/09/2024 0.7 0.5 - 1.4 mg/dL Final     TSH   Date Value Ref Range Status   02/09/2024 2.287 0.400 - 4.000 uIU/mL Final     Hemoglobin A1C   Date Value Ref Range Status   07/18/2023 4.9 4.5 - 6.2 % Final     Comment:     According to ADA guidelines, hemoglobin A1C <7.0% represents  optimal control in non-pregnant diabetic patients.  Different  metrics may apply to specific populations.   Standards of Medical Care in Diabetes - 2016.    For the purpose of screening for the presence of diabetes:  <5.7%     Consistent with the absence of diabetes  5.7-6.4%  Consistent with increasing risk for diabetes   (prediabetes)  >or=6.5%  Consistent with diabetes    Currently no consensus exists for use of hemoglobin A1C  for diagnosis of diabetes for children.         Past Medical History  She has a past medical history of Pharyngitis.    Family / Surgical / Social History  Her family history includes Breast cancer in her maternal grandmother; Heart disease in her maternal grandfather; Hyperlipidemia in her maternal grandfather and mother; Hypertension in her maternal grandfather and mother.    Past Surgical History:   Procedure Laterality Date    neck abscess  2022    No past surgical history         Social History     Tobacco Use    Smoking status: Every Day     Types: Vaping with nicotine    Smokeless tobacco: Never    Tobacco comments:     Recent ex smoker 1/2 ppd   Substance and Sexual Activity    Alcohol use: Yes     Alcohol/week: 5.0 standard drinks of alcohol     Types: 4 Glasses of wine, 1 Shots of liquor per week     Comment: socially    Drug use: Never    Sexual activity: Yes     Partners: Male     Birth control/protection: Condom       Medications  She has a current medication list which includes the following prescription(s): fluticasone propionate and neilmed sinus rinse  "complete.      Allergies  Review of patient's allergies indicates:  No Known Allergies    All medications, allergies, and past history have been reviewed.    Objective:      Vitals:      2/8/2024    12:59 PM 8/7/2024     8:46 AM 10/31/2024     1:34 PM   Vitals - 1 value per visit   SYSTOLIC 110 103 104   DIASTOLIC 74 64 59   Pulse 52 54 59   SPO2 97 %     Weight (lb) 107.3 111.99 110.89   Weight (kg) 48.671 50.8 50.3   Height 5' 2" (1.575 m) 5' 3" (1.6 m) 5' 3" (1.6 m)   BMI (Calculated) 19.6 19.8 19.6   Pain Score Zero One Zero       There is no height or weight on file to calculate BSA.    Physical Exam:    GENERAL  APPEARANCE -  alert, appears stated age, and cooperative  BARRIER(S) TO COMMUNICATION -  none VOICE - appropriate for age and gender    INTEGUMENTARY  no suspicious head and neck lesions    HEENT  HEAD: Normocephalic, without obvious abnormality, atraumatic  FACE: INSPECTION - Symmetric, no signs of trauma, no suspicious lesion(s)      STRENGTH - facial symmetry intact     PALPATION -  No masses     SALIVARY GLANDS - non-tender with no appreciable mass    NECK/THYROID: normal atraumatic, no neck masses, normal thyroid, no jvd    EYES  Normal occular alignment and mobility with no visible nystagmus at rest    EARS/NOSE/MOUTH/THROAT  EARS  PINNAE AND EXTERNAL EARS - no suspicious lesion OTOSCOPIC EXAM (surgical microscopy was not used for visualization/instrumentation): EAR EXAM - Normal ear canals, tympanic membranes and mobility, and middle ear spaces bilaterally.  HEARING - grossly intact to voice/finger rub    NOSE AND SINUSES  EXTERNAL NOSE - Grossly normal for age/sex  SEPTUM - mild bowing deviation right anteriorly TURBINATES - within normal limits MUCOSA - within normal limits     MOUTH AND THROAT   ORAL CAVITY, LIPS, TEETH, GUMS & TONGUE - moist, no suspicious lesions  OROPHARYNX /TONSILS/PHARYNGEAL WALLS/HYPOPHARYNX - 1+ tonsils bilaterally.  Some left gross cavitation no appreciable " stones.  NASOPHARYNX - limited mirror exam - unable to visualize due to anatomy/gag  LARYNX -  - limited mirror exam - unable to visualize due to anatomy/gag      CHEST AND LUNG   INSPECTION & AUSCULTATION - normal effort, no stridor    CARDIOVASCULAR  AUSCULTATION & PERIPHERAL VASCULAR - regular rate and rhythm.    NEUROLOGIC  MENTAL STATUS - alert, interactive CRANIAL NERVES - normal    LYMPHATIC  HEAD AND NECK - non-palpable; SUPRACLAVICULAR - deferred      Procedure(s):  None          Assessment:      Problem List Items Addressed This Visit          ENT    History of peritonsillar abscess - Primary     Other Visit Diagnoses       Tonsillith        Cryptic tonsil                       Plan:      We will proceed with tonsillectomy bilaterally at our next mutually convenient date.  Patient needs to be sometime after January 25th for 's birthday.  I have sent in postoperative dexamethasone and printed instructions for postoperative care.        Voice recognition software was used in the creation of this note/communication and any sound-alike errors which may have occurred from its use should be taken in context when interpreting.  If such errors prevent a clear understanding of the note/communication, please contact the office for clarification.

## 2025-01-15 NOTE — PATIENT INSTRUCTIONS
POSTOPERATIVE INSTRUCTIONS    DIET:  Advanced to a regular diet slowly today encouraging fluids.  Soft foods and LOTS of fluids for 2 full weeks.  Avoid any abrasive foods like chips and pizza crusts for the full 2 weeks.  Encourage fluids more than food especially in the first 48 hours.    ACTIVITY:  No strenuous activity or heavy lifting for 2 weeks as discussed.  May return to work/school in 5-7 days (light activity only).  No gym or sports for 2 weeks.  No travel outside the area for 2 full weeks.    WOUND CARE:  No indication for antibiotics.  Have Afrin/oxymetazoline on hand to help stop bleeding. If bleeding occurs and is persistent spray oxymetazoline directly to the tonsillecomy site that is bleeding and repeat as needed.  Any heavy bleeding or bleeding that is recurrent or will not stop with Afrin/oxymetazoline needs to be evaluated in the emergency department.    CALL:  Call with any concerns including temperature greater than 101°, persistent bleeding, neck stiffness.    FOLLOW-UP:  If not contacted call the office for 4 week follow-up.  Return sooner with any concerns.    MEDICATIONS: There is no indication for antibiotics.  Have Afrin/oxymetazoline on hand to help stop bleeding if bleeding occurs in his persistent.  Tylenol EVERY 5 hours AROUND THE CLOCK for the first 48 hours to control pain.  Set an alarm.  Skipping doses can result in uncontrolled pain.  Ibuprofen 600-800 mg 3 times daily IN ADDITION to Tylenol as needed in between for breakthrough pain can be started AFTER 48 hours if pain is NOT controlled with Tylenol alone.  Use prescribed narcotic medication (codeine, hydrocodone, meperidine, oxycodone) only for break through pain IN ADDITION to scheduled Tylenol/acetaminophen.  Take the oral dexamethasone (steroid) as prescribed by mouth 3 days after surgery in one single large dose.

## 2025-01-15 NOTE — PROGRESS NOTES
[facility]    Subjective:      Patient: Kade Moreno Patient PCP: Ector Kapadia APRN,ALISSONP-C         :  1995     Sex:  female      MRN:  [id number]          Date of Visit: 01/15/2025      Chief Complaint: Follow-up (Follow up visit to schedule tonsillectomy. )      Patient ID: Kade Moreno is a 29 y.o. female     01/15/2025 scheduled follow up visit patient seen 2-1/2 months ago with history of prior peritonsillar abscess and some chronic irritative throat symptoms but no repeat peritonsillar infection who is interested in proceeding with tonsillectomy given the chronic nature of her tonsils stones and symptoms but wanted to wait until after the holidays.  She returns today with ongoing chronic irritative symptoms without any recurrent infection.  Symptoms are not controlled with nasal allergy care gargling and stone maintenance.  She continues to have bad breath and irritation in the tonsils.    10/31/2024 initial patient consultation Patient is a previous smoker current vape user with a past medical history of prior peritonsillar abscess (1 abscess on the right drain twice in 2 weeks) and anemia (hemoglobin 11.8 in February) self-referred for discussion of possible benefits of tonsillectomy.  Seen by Mr. Mack and August with chronic irritative symptoms of 1 to 2 +tonsils with some cryptic changes.  No repeat peritonsillar infection.  No significant subjective allergy or reflux symptoms.    Has some mouth breathing at night.  It has not tried nasal steroids or rinses.  Uses gargles.  Water pick was too dramatic.  Uses a syringe and Q-tips to keep the tonsils cleared of stones.  She has become obsessed with a tonsil stones.  She is worried about halitosis.  Takes good care of her mouth and teeth.    Labs:  WBC   Date Value Ref Range Status   2024 3.84 (L) 3.90 - 12.70 K/uL Final     Hemoglobin   Date Value Ref Range Status   2024 11.8 (L) 12.0 - 16.0 g/dL  Final     Platelets   Date Value Ref Range Status   02/09/2024 284 150 - 450 K/uL Final     Creatinine   Date Value Ref Range Status   02/09/2024 0.7 0.5 - 1.4 mg/dL Final     TSH   Date Value Ref Range Status   02/09/2024 2.287 0.400 - 4.000 uIU/mL Final     Hemoglobin A1C   Date Value Ref Range Status   07/18/2023 4.9 4.5 - 6.2 % Final     Comment:     According to ADA guidelines, hemoglobin A1C <7.0% represents  optimal control in non-pregnant diabetic patients.  Different  metrics may apply to specific populations.   Standards of Medical Care in Diabetes - 2016.    For the purpose of screening for the presence of diabetes:  <5.7%     Consistent with the absence of diabetes  5.7-6.4%  Consistent with increasing risk for diabetes   (prediabetes)  >or=6.5%  Consistent with diabetes    Currently no consensus exists for use of hemoglobin A1C  for diagnosis of diabetes for children.         Past Medical History  She has a past medical history of Pharyngitis.    Family / Surgical / Social History  Her family history includes Breast cancer in her maternal grandmother; Heart disease in her maternal grandfather; Hyperlipidemia in her maternal grandfather and mother; Hypertension in her maternal grandfather and mother.    Past Surgical History:   Procedure Laterality Date    neck abscess  2022    No past surgical history         Social History     Tobacco Use    Smoking status: Every Day     Types: Vaping with nicotine    Smokeless tobacco: Never    Tobacco comments:     Recent ex smoker 1/2 ppd   Substance and Sexual Activity    Alcohol use: Yes     Alcohol/week: 5.0 standard drinks of alcohol     Types: 4 Glasses of wine, 1 Shots of liquor per week     Comment: socially    Drug use: Never    Sexual activity: Yes     Partners: Male     Birth control/protection: Condom       Medications  She has a current medication list which includes the following prescription(s): fluticasone propionate and neilmed sinus rinse  "complete.      Allergies  Review of patient's allergies indicates:  No Known Allergies    All medications, allergies, and past history have been reviewed.    Objective:      Vitals:      2/8/2024    12:59 PM 8/7/2024     8:46 AM 10/31/2024     1:34 PM   Vitals - 1 value per visit   SYSTOLIC 110 103 104   DIASTOLIC 74 64 59   Pulse 52 54 59   SPO2 97 %     Weight (lb) 107.3 111.99 110.89   Weight (kg) 48.671 50.8 50.3   Height 5' 2" (1.575 m) 5' 3" (1.6 m) 5' 3" (1.6 m)   BMI (Calculated) 19.6 19.8 19.6   Pain Score Zero One Zero       There is no height or weight on file to calculate BSA.    Physical Exam:    GENERAL  APPEARANCE -  alert, appears stated age, and cooperative  BARRIER(S) TO COMMUNICATION -  none VOICE - appropriate for age and gender    INTEGUMENTARY  no suspicious head and neck lesions    HEENT  HEAD: Normocephalic, without obvious abnormality, atraumatic  FACE: INSPECTION - Symmetric, no signs of trauma, no suspicious lesion(s)      STRENGTH - facial symmetry intact     PALPATION -  No masses     SALIVARY GLANDS - non-tender with no appreciable mass    NECK/THYROID: normal atraumatic, no neck masses, normal thyroid, no jvd    EYES  Normal occular alignment and mobility with no visible nystagmus at rest    EARS/NOSE/MOUTH/THROAT  EARS  PINNAE AND EXTERNAL EARS - no suspicious lesion OTOSCOPIC EXAM (surgical microscopy was not used for visualization/instrumentation): EAR EXAM - Normal ear canals, tympanic membranes and mobility, and middle ear spaces bilaterally.  HEARING - grossly intact to voice/finger rub    NOSE AND SINUSES  EXTERNAL NOSE - Grossly normal for age/sex  SEPTUM - mild bowing deviation right anteriorly TURBINATES - within normal limits MUCOSA - within normal limits     MOUTH AND THROAT   ORAL CAVITY, LIPS, TEETH, GUMS & TONGUE - moist, no suspicious lesions  OROPHARYNX /TONSILS/PHARYNGEAL WALLS/HYPOPHARYNX - 1+ tonsils bilaterally.  Some left gross cavitation no appreciable " stones.  NASOPHARYNX - limited mirror exam - unable to visualize due to anatomy/gag  LARYNX -  - limited mirror exam - unable to visualize due to anatomy/gag      CHEST AND LUNG   INSPECTION & AUSCULTATION - normal effort, no stridor    CARDIOVASCULAR  AUSCULTATION & PERIPHERAL VASCULAR - regular rate and rhythm.    NEUROLOGIC  MENTAL STATUS - alert, interactive CRANIAL NERVES - normal    LYMPHATIC  HEAD AND NECK - non-palpable; SUPRACLAVICULAR - deferred      Procedure(s):  None          Assessment:      Problem List Items Addressed This Visit          ENT    History of peritonsillar abscess - Primary     Other Visit Diagnoses       Tonsillith        Cryptic tonsil                       Plan:      We will proceed with tonsillectomy bilaterally at our next mutually convenient date.  Patient needs to be sometime after January 25th for 's birthday.  I have sent in postoperative dexamethasone and printed instructions for postoperative care.        Voice recognition software was used in the creation of this note/communication and any sound-alike errors which may have occurred from its use should be taken in context when interpreting.  If such errors prevent a clear understanding of the note/communication, please contact the office for clarification.

## 2025-02-03 ENCOUNTER — TELEPHONE (OUTPATIENT)
Dept: OTOLARYNGOLOGY | Facility: CLINIC | Age: 30
End: 2025-02-03
Payer: COMMERCIAL

## 2025-02-03 ENCOUNTER — ANESTHESIA EVENT (OUTPATIENT)
Dept: SURGERY | Facility: HOSPITAL | Age: 30
End: 2025-02-03
Payer: COMMERCIAL

## 2025-02-03 NOTE — TELEPHONE ENCOUNTER
----- Message from Osmin sent at 2/3/2025 10:45 AM CST -----  Contact: Pt  .Type:  Needs Medical Advice    Who Called: pt  Would the patient rather a call back or a response via [facility]?  Call back  Best Call Back Number: [phone]  Additional Information:  Pt. Is returning a call back to the office

## 2025-02-03 NOTE — TELEPHONE ENCOUNTER
Called pt back. Pt states that she did get the call from [facility] [employer] and has made arrangements. Will keep surgery as scheduled. ThanksJo Ann

## 2025-02-03 NOTE — TELEPHONE ENCOUNTER
----- Message from Thania sent at 2/3/2025  1:37 PM CST -----  Contact: Kade  Type:  Patient Returning Call    Who Called:Kade  Who Left Message for Patient:Jo Ann  Does the patient know what this is regarding?:missed call for surgery  Would the patient rather a call back or a response via [facility]? Call back  Best Call Back Number:[phone]  Additional Information: Kade missed a call and would like a call back to discuss surgery    Thanks  ERNESTINE

## 2025-02-03 NOTE — TELEPHONE ENCOUNTER
"Ad Cuevas MD Raynes, Kari, LPN; Vanita Cruz  No          Previous Messages       ----- Message -----  From: Jo Ann Ortega LPN  Sent: 1/31/2025  12:55 PM CST  To: Ad Cuevas MD  Subject: FW: Medical Urgency                                ----- Message -----  From: Vanita Cruz  Sent: 1/31/2025  12:52 PM CST  To: Mason Duong Staff; *  Subject: Medical Urgency                                  Good morning/Afternoon      Please respond YES or NO via In-basket or contact [facility] @ [phone]      Is this procedure medically urgent or non-medically urgent?          Medical Urgency Definition    If you can Answer yes to one of the following questions, the  Case will be considered "Medically Urgent" and will be done as  Scheduled irrespective of whether [facility] will receive payment.    *If this particular case is not done as scheduled, would the patient  Experience loss of life?    *Loss of Limb?    *Irreversible loss of function?    *Would their condition significantly worsen?            Financial Call Center has not been able to contact patient.    If this is not "Medically Urgent", please have your office contact the patient to reschedule until financial obligations can be met.      As a courtesy for DOS  2/4/2025 and Procedure/Test  Procedures:     61496 (CPT®) - IA REMOVAL OF TONSILS,12+ Y/O     31565 (CPT®) - IA REMOVE TONSILS/ADENOIDS,12+ Y/O    Pt has a liability balance due of $1,300.00    Thanks,  Vanita Cruz  Financial Clearance Department  [phone]  "

## 2025-02-03 NOTE — TELEPHONE ENCOUNTER
Left message on voicemail for pt to call back. Need to find out if pt either made payment arrangements or if we need to reschedule procedure for tomorrow. Thanks, Jo Ann

## 2025-02-04 ENCOUNTER — HOSPITAL ENCOUNTER (OUTPATIENT)
Facility: HOSPITAL | Age: 30
Discharge: HOME | End: 2025-02-04
Attending: OTOLARYNGOLOGY | Admitting: OTOLARYNGOLOGY
Payer: COMMERCIAL

## 2025-02-04 ENCOUNTER — ANESTHESIA (OUTPATIENT)
Dept: SURGERY | Facility: HOSPITAL | Age: 30
End: 2025-02-04
Payer: COMMERCIAL

## 2025-02-04 DIAGNOSIS — J35.8 CRYPTIC TONSIL: ICD-10-CM

## 2025-02-04 DIAGNOSIS — J35.8 TONSILLITH: ICD-10-CM

## 2025-02-04 DIAGNOSIS — Z87.09 HISTORY OF PERITONSILLAR ABSCESS: ICD-10-CM

## 2025-02-04 LAB
B-HCG UR QL: NEGATIVE
CTP QC/QA: YES

## 2025-02-04 PROCEDURE — 63600175 PHARM REV CODE 636 W HCPCS: Performed by: NURSE ANESTHETIST, CERTIFIED REGISTERED

## 2025-02-04 PROCEDURE — 37000009 HC ANESTHESIA EA ADD 15 MINS: Performed by: OTOLARYNGOLOGY

## 2025-02-04 PROCEDURE — 37000008 HC ANESTHESIA 1ST 15 MINUTES: Performed by: OTOLARYNGOLOGY

## 2025-02-04 PROCEDURE — D9220A PRA ANESTHESIA: Mod: CRNA,,, | Performed by: NURSE ANESTHETIST, CERTIFIED REGISTERED

## 2025-02-04 PROCEDURE — 63600175 PHARM REV CODE 636 W HCPCS: Performed by: ANESTHESIOLOGY

## 2025-02-04 PROCEDURE — D9220A PRA ANESTHESIA: Mod: ANES,,, | Performed by: ANESTHESIOLOGY

## 2025-02-04 PROCEDURE — 71000033 HC RECOVERY, INTIAL HOUR: Performed by: OTOLARYNGOLOGY

## 2025-02-04 PROCEDURE — 25000003 PHARM REV CODE 250: Performed by: ANESTHESIOLOGY

## 2025-02-04 PROCEDURE — 36000707: Performed by: OTOLARYNGOLOGY

## 2025-02-04 PROCEDURE — 71000039 HC RECOVERY, EACH ADD'L HOUR: Performed by: OTOLARYNGOLOGY

## 2025-02-04 PROCEDURE — 81025 URINE PREGNANCY TEST: CPT | Performed by: OTOLARYNGOLOGY

## 2025-02-04 PROCEDURE — 63600175 PHARM REV CODE 636 W HCPCS: Performed by: OTOLARYNGOLOGY

## 2025-02-04 PROCEDURE — 42826 REMOVAL OF TONSILS: CPT | Mod: ,,, | Performed by: OTOLARYNGOLOGY

## 2025-02-04 PROCEDURE — 25000003 PHARM REV CODE 250: Performed by: NURSE ANESTHETIST, CERTIFIED REGISTERED

## 2025-02-04 PROCEDURE — 36000706: Performed by: OTOLARYNGOLOGY

## 2025-02-04 RX ORDER — ACETAMINOPHEN 500 MG
TABLET ORAL
Start: 2025-02-04 | End: 2025-02-26

## 2025-02-04 RX ORDER — LIDOCAINE HYDROCHLORIDE 10 MG/ML
1 INJECTION, SOLUTION EPIDURAL; INFILTRATION; INTRACAUDAL; PERINEURAL ONCE
Status: COMPLETED | OUTPATIENT
Start: 2025-02-04 | End: 2025-02-04

## 2025-02-04 RX ORDER — ONDANSETRON HYDROCHLORIDE 2 MG/ML
4 INJECTION, SOLUTION INTRAVENOUS ONCE AS NEEDED
Status: DISCONTINUED | OUTPATIENT
Start: 2025-02-04 | End: 2025-02-04 | Stop reason: HOSPADM

## 2025-02-04 RX ORDER — SUCCINYLCHOLINE CHLORIDE 20 MG/ML
INJECTION INTRAMUSCULAR; INTRAVENOUS
Status: DISCONTINUED | OUTPATIENT
Start: 2025-02-04 | End: 2025-02-04

## 2025-02-04 RX ORDER — SODIUM CHLORIDE, SODIUM LACTATE, POTASSIUM CHLORIDE, CALCIUM CHLORIDE 600; 310; 30; 20 MG/100ML; MG/100ML; MG/100ML; MG/100ML
INJECTION, SOLUTION INTRAVENOUS CONTINUOUS
Status: DISCONTINUED | OUTPATIENT
Start: 2025-02-04 | End: 2025-02-04 | Stop reason: HOSPADM

## 2025-02-04 RX ORDER — OXYCODONE HYDROCHLORIDE 5 MG/1
5 TABLET ORAL ONCE AS NEEDED
Status: COMPLETED | OUTPATIENT
Start: 2025-02-04 | End: 2025-02-04

## 2025-02-04 RX ORDER — BUPIVACAINE HYDROCHLORIDE 5 MG/ML
INJECTION, SOLUTION EPIDURAL; INTRACAUDAL
Status: DISCONTINUED | OUTPATIENT
Start: 2025-02-04 | End: 2025-02-04 | Stop reason: HOSPADM

## 2025-02-04 RX ORDER — DEXAMETHASONE 4 MG/1
TABLET ORAL
Qty: 3 TABLET | Refills: 0 | Status: SHIPPED | OUTPATIENT
Start: 2025-02-07 | End: 2025-02-07

## 2025-02-04 RX ORDER — SCOPOLAMINE 1 MG/3D
1 PATCH, EXTENDED RELEASE TRANSDERMAL
Status: DISCONTINUED | OUTPATIENT
Start: 2025-02-04 | End: 2025-02-04 | Stop reason: HOSPADM

## 2025-02-04 RX ORDER — MIDAZOLAM HYDROCHLORIDE 1 MG/ML
INJECTION INTRAMUSCULAR; INTRAVENOUS
Status: DISCONTINUED | OUTPATIENT
Start: 2025-02-04 | End: 2025-02-04

## 2025-02-04 RX ORDER — PROPOFOL 10 MG/ML
VIAL (ML) INTRAVENOUS
Status: DISCONTINUED | OUTPATIENT
Start: 2025-02-04 | End: 2025-02-04

## 2025-02-04 RX ORDER — ROCURONIUM BROMIDE 10 MG/ML
INJECTION, SOLUTION INTRAVENOUS
Status: DISCONTINUED | OUTPATIENT
Start: 2025-02-04 | End: 2025-02-04

## 2025-02-04 RX ORDER — DEXAMETHASONE SODIUM PHOSPHATE 4 MG/ML
INJECTION, SOLUTION INTRA-ARTICULAR; INTRALESIONAL; INTRAMUSCULAR; INTRAVENOUS; SOFT TISSUE
Status: DISCONTINUED | OUTPATIENT
Start: 2025-02-04 | End: 2025-02-04

## 2025-02-04 RX ORDER — OXYCODONE HYDROCHLORIDE 5 MG/1
5 TABLET ORAL EVERY 4 HOURS PRN
Qty: 30 TABLET | Refills: 0 | Status: SHIPPED | OUTPATIENT
Start: 2025-02-04 | End: 2025-02-26

## 2025-02-04 RX ORDER — FENTANYL CITRATE 50 UG/ML
INJECTION, SOLUTION INTRAMUSCULAR; INTRAVENOUS
Status: DISCONTINUED | OUTPATIENT
Start: 2025-02-04 | End: 2025-02-04

## 2025-02-04 RX ORDER — ONDANSETRON HYDROCHLORIDE 2 MG/ML
INJECTION, SOLUTION INTRAMUSCULAR; INTRAVENOUS
Status: DISCONTINUED | OUTPATIENT
Start: 2025-02-04 | End: 2025-02-04

## 2025-02-04 RX ORDER — SODIUM CHLORIDE, SODIUM LACTATE, POTASSIUM CHLORIDE, CALCIUM CHLORIDE 600; 310; 30; 20 MG/100ML; MG/100ML; MG/100ML; MG/100ML
125 INJECTION, SOLUTION INTRAVENOUS CONTINUOUS
Status: DISCONTINUED | OUTPATIENT
Start: 2025-02-04 | End: 2025-02-04 | Stop reason: HOSPADM

## 2025-02-04 RX ORDER — ACETAMINOPHEN 10 MG/ML
INJECTION, SOLUTION INTRAVENOUS
Status: DISCONTINUED | OUTPATIENT
Start: 2025-02-04 | End: 2025-02-04

## 2025-02-04 RX ORDER — LIDOCAINE HYDROCHLORIDE 20 MG/ML
INJECTION INTRAVENOUS
Status: DISCONTINUED | OUTPATIENT
Start: 2025-02-04 | End: 2025-02-04

## 2025-02-04 RX ORDER — FENTANYL CITRATE 50 UG/ML
25 INJECTION, SOLUTION INTRAMUSCULAR; INTRAVENOUS EVERY 5 MIN PRN
Status: DISCONTINUED | OUTPATIENT
Start: 2025-02-04 | End: 2025-02-04 | Stop reason: HOSPADM

## 2025-02-04 RX ADMIN — FENTANYL CITRATE 25 MCG: 50 INJECTION, SOLUTION INTRAMUSCULAR; INTRAVENOUS at 09:02

## 2025-02-04 RX ADMIN — SUCCINYLCHOLINE CHLORIDE 100 MG: 20 INJECTION, SOLUTION INTRAMUSCULAR; INTRAVENOUS; PARENTERAL at 08:02

## 2025-02-04 RX ADMIN — FENTANYL CITRATE 25 MCG: 0.05 INJECTION, SOLUTION INTRAMUSCULAR; INTRAVENOUS at 09:02

## 2025-02-04 RX ADMIN — FENTANYL CITRATE 50 MCG: 0.05 INJECTION, SOLUTION INTRAMUSCULAR; INTRAVENOUS at 08:02

## 2025-02-04 RX ADMIN — GLYCOPYRROLATE 0.1 MG: 0.2 INJECTION, SOLUTION INTRAMUSCULAR; INTRAVITREAL at 08:02

## 2025-02-04 RX ADMIN — LIDOCAINE HYDROCHLORIDE 50 MG: 20 INJECTION, SOLUTION INTRAVENOUS at 08:02

## 2025-02-04 RX ADMIN — ONDANSETRON 4 MG: 2 INJECTION INTRAMUSCULAR; INTRAVENOUS at 08:02

## 2025-02-04 RX ADMIN — SODIUM CHLORIDE, POTASSIUM CHLORIDE, SODIUM LACTATE AND CALCIUM CHLORIDE: 600; 310; 30; 20 INJECTION, SOLUTION INTRAVENOUS at 08:02

## 2025-02-04 RX ADMIN — DEXAMETHASONE SODIUM PHOSPHATE 12 MG: 4 INJECTION, SOLUTION INTRAMUSCULAR; INTRAVENOUS at 08:02

## 2025-02-04 RX ADMIN — PROPOFOL 150 MG: 10 INJECTION, EMULSION INTRAVENOUS at 08:02

## 2025-02-04 RX ADMIN — MIDAZOLAM HYDROCHLORIDE 2 MG: 1 INJECTION, SOLUTION INTRAMUSCULAR; INTRAVENOUS at 08:02

## 2025-02-04 RX ADMIN — ACETAMINOPHEN 810 MG: 10 INJECTION INTRAVENOUS at 08:02

## 2025-02-04 RX ADMIN — ROCURONIUM BROMIDE 5 MG: 10 INJECTION, SOLUTION INTRAVENOUS at 08:02

## 2025-02-04 RX ADMIN — OXYCODONE HYDROCHLORIDE 5 MG: 5 TABLET ORAL at 10:02

## 2025-02-04 RX ADMIN — SCOPOLAMINE 1 PATCH: 1.5 PATCH, EXTENDED RELEASE TRANSDERMAL at 09:02

## 2025-02-04 RX ADMIN — LIDOCAINE HYDROCHLORIDE 10 MG: 10 INJECTION, SOLUTION EPIDURAL; INFILTRATION; INTRACAUDAL at 08:02

## 2025-02-04 NOTE — DISCHARGE INSTRUCTIONS
DIET:  Advanced to a regular diet slowly today encouraging fluids.  Soft foods and LOTS of fluids for 2 full weeks.  Avoid any abrasive foods like chips and pizza crusts for the full 2 weeks.  Encourage fluids more than food especially in the first 48 hours.    ACTIVITY:  No strenuous activity or heavy lifting for 2 weeks as discussed.  May return to work/school in 5-7 days (light activity only).  No gym or sports for 2 weeks.  No travel outside the area for 2 full weeks.    WOUND CARE:  No indication for antibiotics.  Have Afrin/oxymetazoline on hand to help stop bleeding. If bleeding occurs and is persistent spray oxymetazoline directly to the tonsillecomy site that is bleeding and repeat as needed.  Any heavy bleeding or bleeding that is recurrent or will not stop with Afrin/oxymetazoline needs to be evaluated in the emergency department.    CALL:  Call with any concerns including temperature greater than 101°, persistent bleeding, neck stiffness.    FOLLOW-UP:  If not contacted call the office for 4 week follow-up.  Return sooner with any concerns.    MEDICATIONS: There is no indication for antibiotics.  Have Afrin/oxymetazoline on hand to help stop bleeding if bleeding occurs in his persistent.  Tylenol EVERY 5 hours AROUND THE CLOCK for the first 48 hours to control pain.  Set an alarm.  Skipping doses can result in uncontrolled pain.  Ibuprofen 600-800 mg 3 times daily IN ADDITION to Tylenol as needed in between for breakthrough pain can be started AFTER 48 hours if pain is NOT controlled with Tylenol alone.  Use prescribed narcotic medication (codeine, hydrocodone, meperidine, oxycodone) only for break through pain IN ADDITION to scheduled Tylenol/acetaminophen.  Take the oral dexamethasone (steroid) as prescribed by mouth 3 days after surgery in one single large dose.

## 2025-02-04 NOTE — OR NURSING
Pt has nose ring to left nare that she declines removing. Instructed on risk for burn and fire. Verbalized understanding. Dr. Cuevas wants to proceed with case with nose ring taped with plastic tape.

## 2025-02-04 NOTE — ANESTHESIA PROCEDURE NOTES
Intubation    Date/Time: 2/4/2025 8:50 AM    Performed by: Jesusita Llanos CRNA  Authorized by: Jonas St MD    Intubation:     Induction:  Inhalational - mask    Intubated:  Postinduction    Mask Ventilation:  Easy mask    Attempts:  1    Attempted By:  CRNA    Method of Intubation:  Video laryngoscopy    Blade:  Kelly 3    Laryngeal View Grade: Grade I - full view of cords      Difficult Airway Encountered?: No      Complications:  None    Airway Device:  Oral sascha    Airway Device Size:  7.0    Style/Cuff Inflation:  Cuffed (inflated to minimal occlusive pressure)    Tube secured:  20    Secured at:  The lips    Placement Verified By:  Capnometry    Complicating Factors:  None    Findings Post-Intubation:  BS equal bilateral and atraumatic/condition of teeth unchanged

## 2025-02-04 NOTE — ANESTHESIA PREPROCEDURE EVALUATION
02/04/2025  Kade Moreno is a 29 y.o., female.      Pre-op Assessment    I have reviewed the Patient Summary Reports.     I have reviewed the Nursing Notes. I have reviewed the NPO Status.   I have reviewed the Medications.     Review of Systems  Anesthesia Hx:  No previous Anesthesia                Social:  Vaping       Hematology/Oncology:       -- Anemia:                                  EENT/Dental:            Chronic Tonsillitis    Cardiovascular:  Cardiovascular Normal                                              Neurological:  Neurology Normal                                          Physical Exam  General: Well nourished    Airway:  Mallampati: II   Mouth Opening: Normal  TM Distance: Normal  Neck ROM: Normal ROM        Anesthesia Plan  Type of Anesthesia, risks & benefits discussed:    Anesthesia Type: Gen ETT  Intra-op Monitoring Plan: Standard ASA Monitors  Post Op Pain Control Plan: multimodal analgesia  Induction:  IV  Airway Plan: Video  Informed Consent: Informed consent signed with the Patient and all parties understand the risks and agree with anesthesia plan.  All questions answered.   ASA Score: 2    Ready For Surgery From Anesthesia Perspective.     .

## 2025-02-04 NOTE — TRANSFER OF CARE
"Anesthesia Transfer of Care Note    Patient: Kade Moreno    Procedure(s) Performed: Procedure(s) (LRB):  TONSILLECTOMY (Bilateral)    Patient location: PACU    Anesthesia Type: general    Transport from OR: Transported from OR on room air with adequate spontaneous ventilation    Post pain: adequate analgesia    Post assessment: no apparent anesthetic complications and tolerated procedure well    Post vital signs: stable    Level of consciousness: awake, alert and oriented    Nausea/Vomiting: no nausea/vomiting    Complications: none    Transfer of care protocol was followed      Last vitals: Visit Vitals  BP (!) 112/56   Pulse 98   Temp 36.6 °C (97.9 °F) (Skin)   Resp 16   Ht 5' 3" (1.6 m)   Wt 54.6 kg (120 lb 5.9 oz)   SpO2 100%   Breastfeeding No   BMI 21.32 kg/m²     "

## 2025-02-04 NOTE — DISCHARGE SUMMARY
[facility] - Periop Services  Discharge Note  Short Stay    Procedure(s) (LRB):  TONSILLECTOMY (Bilateral)      OUTCOME: Patient tolerated treatment/procedure well without complication and is now ready for discharge.    DISPOSITION: Home or Self Care    FINAL DIAGNOSIS:  chronic tonsillar disease    FOLLOWUP: in 4 weeks in clinic as scheduled    DISCHARGE INSTRUCTIONS:      DIET:  Advanced to a regular diet slowly today encouraging fluids.  Soft foods and LOTS of fluids for 2 full weeks.  Avoid any abrasive foods like chips and pizza crusts for the full 2 weeks.  Encourage fluids more than food especially in the first 48 hours.     ACTIVITY:  No strenuous activity or heavy lifting for 2 weeks as discussed.  May return to work/school in 5-7 days (light activity only).  No gym or sports for 2 weeks.  No travel outside the area for 2 full weeks.     WOUND CARE:  No indication for antibiotics.  Have Afrin/oxymetazoline on hand to help stop bleeding. If bleeding occurs and is persistent spray oxymetazoline directly to the tonsillecomy site that is bleeding and repeat as needed.  Any heavy bleeding or bleeding that is recurrent or will not stop with Afrin/oxymetazoline needs to be evaluated in the emergency department.     CALL:  Call with any concerns including temperature greater than 101°, persistent bleeding, neck stiffness.    FOLLOW-UP:  If not contacted call the office for 4 week follow-up.  Return sooner with any concerns.    MEDICATIONS: There is no indication for antibiotics.  Have Afrin/oxymetazoline on hand to help stop bleeding if bleeding occurs in his persistent.  Tylenol EVERY 5 hours AROUND THE CLOCK for the first 48 hours to control pain.  Set an alarm.  Skipping doses can result in uncontrolled pain.  Ibuprofen 600-800 mg 3 times daily IN ADDITION to Tylenol as needed in between for breakthrough pain can be started AFTER 48 hours if pain is NOT controlled with Tylenol alone.  Use  prescribed narcotic medication (codeine, hydrocodone, meperidine, oxycodone) only for break through pain IN ADDITION to scheduled Tylenol/acetaminophen.  Take the oral dexamethasone (steroid) as prescribed by mouth 3 days after surgery in one single large dose.    Discharge Procedure Orders   Notify your health care provider if you experience any of the following:  temperature >100.4     Notify your health care provider if you experience any of the following:  persistent nausea and vomiting or diarrhea     Notify your health care provider if you experience any of the following:  severe uncontrolled pain     Notify your health care provider if you experience any of the following:  redness, tenderness, or signs of infection (pain, swelling, redness, odor or green/yellow discharge around incision site)     Notify your health care provider if you experience any of the following:  difficulty breathing or increased cough        TIME SPENT ON DISCHARGE:   5 minutes

## 2025-02-04 NOTE — ANESTHESIA POSTPROCEDURE EVALUATION
Anesthesia Post Evaluation    Patient: Kade Moreno    Procedure(s) Performed: Procedure(s) (LRB):  TONSILLECTOMY (Bilateral)    Final Anesthesia Type: general      Patient location during evaluation: PACU  Patient participation: Yes- Able to Participate  Level of consciousness: awake  Post-procedure vital signs: reviewed and stable  Pain management: adequate  Airway patency: patent    PONV status at discharge: No PONV  Anesthetic complications: no      Cardiovascular status: blood pressure returned to baseline  Respiratory status: unassisted  Hydration status: euvolemic  Follow-up not needed.              Vitals Value Taken Time   /56 02/04/25 1030   Temp 36.6 °C (97.9 °F) 02/04/25 0930   Pulse 61 02/04/25 1030   Resp 16 02/04/25 1035   SpO2 100 % 02/04/25 1030         Event Time   Out of Recovery 10:44:00         Pain/Gabriella Score: Pain Rating Prior to Med Admin: 5 (2/4/2025 10:35 AM)

## 2025-02-04 NOTE — OP NOTE
[facility]  Otolaryngology - Head/Neck Surgery Department  Operative Note       Date of Procedure: 2/4/2025     Pre-Operative Diagnosis:   History of peritonsillar abscess [Z87.09]  Tonsillith [J35.8]  Cryptic tonsil [J35.8]    Post-Operative Diagnosis:  History of peritonsillar abscess [Z87.09]  Tonsillith [J35.8]  Cryptic tonsil [J35.8]    Procedure:   TONSILLECTOMY (Bilateral)    Surgeon(s):  Ad Cuevas MD    Anesthesia: General  Anesthesiologist: Jonas St MD  CRNA: VietJesusita CRNA    Estimated Blood Loss (EBL): scant ml           Implants: * No implants in log *    Specimens:  Right and Left Tonsils    Complications: None    Indication:   29 y.o. female with recurrent PTA, cavitation and stones.     Consent was signed by patient (guardian) after all risks, benefits, limitations and alternatives to surgery were discussed in detail and legal consent executed and read at time out in the room.    Findings:     No evidence of submucous cleft by palpation and inspection. No suspicious mucosa/lesion. Prominent salivary tissue of superior poles.  Scarring right superior pole NOT on the left.    Procedure in Detail:     After informed consent was obtained in which all risks, benefits, limitations and alternatives were discussed, the patient was brought to the operating room and placed supine on the operating table.      General endotracheal anesthesia was induced by the anesthesia team without complication or difficulty.  The table was turned 90° and time-out was performed.      With the patient draped in standard fashion and FiO2 reduced to less than 30% the Jayden-Williams mouth gag was inserted soft palate inspected palpated then elevated with a red rubber catheter passed through and through both nares over a gauze pad upper lip.      Beginning with the right side the tonsil was extracted with subcapsular dissection using Bovie with all prominent blood vessels and any bleeders addressed at the  time of tonsil removal at reinspection after complete removal of the tonsil.  Any identified bleeding or vessels were addressed at that time. The left side was then completed in the same fashion with findings as above.    Additional cautery most notably of the superior poles was performed with suction Bovie abrading the fossae looking for any bleeding.    The fossae were injected with 5-7 ml of 0.5% plain bupivicaine superficially for analgesia.    A nasal cavities, nasopharynx and oropharynx were copiously irrigated with saline and hemostasis assured after removal of the red rubber catheter and letting down the Jayden-Williams mouth gag.      The stomach was emptied of its contents with a transoral nasogastric tube at which point the patient was returned to anesthesia for extubation and transfer.      All sponge count needle, and instrument counts were correct at the end of the procedure.    There were no known complications of surgery at the time of this operative note's creation.    Voice recognition software was used in the creation of this operative note, any sound alike areas which may have occurred should be taken in context when interpreting.       Condition: Good  Disposition: PACU - hemodynamically stable.  Attestation: I was present and scrubbed for the entire procedure.    Ad Cuevas MD FACS  [facility] Dept of Otolaryngology - Head and Neck Surgery

## 2025-02-05 VITALS
SYSTOLIC BLOOD PRESSURE: 111 MMHG | OXYGEN SATURATION: 100 % | BODY MASS INDEX: 21.33 KG/M2 | WEIGHT: 120.38 LBS | TEMPERATURE: 98 F | RESPIRATION RATE: 16 BRPM | HEIGHT: 63 IN | DIASTOLIC BLOOD PRESSURE: 56 MMHG | HEART RATE: 61 BPM

## 2025-02-26 ENCOUNTER — HOSPITAL ENCOUNTER (OUTPATIENT)
Dept: RADIOLOGY | Facility: CLINIC | Age: 30
Discharge: HOME | End: 2025-02-26
Attending: PODIATRIST
Payer: COMMERCIAL

## 2025-02-26 ENCOUNTER — OFFICE VISIT (OUTPATIENT)
Dept: PODIATRY | Facility: CLINIC | Age: 30
End: 2025-02-26
Payer: COMMERCIAL

## 2025-02-26 VITALS — BODY MASS INDEX: 21.01 KG/M2 | WEIGHT: 118.63 LBS

## 2025-02-26 DIAGNOSIS — M21.619 BUNION: Primary | ICD-10-CM

## 2025-02-26 DIAGNOSIS — M79.671 RIGHT FOOT PAIN: ICD-10-CM

## 2025-02-26 PROCEDURE — 1159F MED LIST DOCD IN RCRD: CPT | Mod: CPTII,S$GLB,, | Performed by: PODIATRIST

## 2025-02-26 PROCEDURE — 99203 OFFICE O/P NEW LOW 30 MIN: CPT | Mod: S$GLB,,, | Performed by: PODIATRIST

## 2025-02-26 PROCEDURE — 3008F BODY MASS INDEX DOCD: CPT | Mod: CPTII,S$GLB,, | Performed by: PODIATRIST

## 2025-02-26 PROCEDURE — 73630 X-RAY EXAM OF FOOT: CPT | Mod: RT,S$GLB,, | Performed by: RADIOLOGY

## 2025-02-26 PROCEDURE — 1160F RVW MEDS BY RX/DR IN RCRD: CPT | Mod: CPTII,S$GLB,, | Performed by: PODIATRIST

## 2025-02-26 PROCEDURE — 99999 PR PBB SHADOW E&M-EST. PATIENT-LVL III: CPT | Mod: PBBFAC,,, | Performed by: PODIATRIST

## 2025-02-26 NOTE — PROGRESS NOTES
[address] [facility] [address]  [place] [address]  Phone: [phone]   [phone]    Patient's PCP:Ector Kapadia APRN,WILLIAMC  Referring Provider: Aaareferral Self    Subjective:      Chief Complaint:: Bunions (BL RT>LT)    VAHID Moreno is a 29 y.o. female who presents today with a complaint of bunion pain, bilateral RT>LT. The current episode started 2 years.  The symptoms include shooting pain. Probable cause of complaint unknown.  The symptoms are aggravated by prolonged walking, shoes. The problem has worsened. Treatment to date have included shoe gear modification which provided minimal relief.         Vitals:    02/26/25 0959   Weight: 53.8 kg (118 lb 9.7 oz)   PainSc: 0-No pain      Shoe Size: 7.5    Past Surgical History:   Procedure Laterality Date    neck abscess  2022    no family history of anes problems      No past surgical history      TONSILLECTOMY Bilateral 2/4/2025    Procedure: TONSILLECTOMY;  Surgeon: Ad Cuevas MD;  Location: [facility];  Service: ENT;  Laterality: Bilateral;     Past Medical History:   Diagnosis Date    Pharyngitis      Family History   Problem Relation Name Age of Onset    Hypertension Mother      Hyperlipidemia Mother      Breast cancer Maternal Grandmother      Hypertension Maternal Grandfather      Hyperlipidemia Maternal Grandfather      Heart disease Maternal Grandfather          Social History:   Marital Status:   Alcohol History:  reports current alcohol use of about 5.0 standard drinks of alcohol per week.  Tobacco History:  reports that she has quit smoking. Her smoking use included vaping with nicotine. She has never used smokeless tobacco.  Drug History:  reports no history of drug use.    Review of patient's allergies indicates:  No Known Allergies    Current Medications[1]    Review of Systems   Constitutional:  Negative for chills, fatigue, fever and unexpected weight change.   HENT:  Negative for hearing loss and  trouble swallowing.    Eyes:  Negative for photophobia and visual disturbance.   Respiratory:  Negative for cough, shortness of breath and wheezing.    Cardiovascular:  Negative for chest pain, palpitations and leg swelling.   Gastrointestinal:  Negative for abdominal pain and nausea.   Genitourinary:  Negative for dysuria and frequency.   Musculoskeletal:  Positive for arthralgias. Negative for back pain, gait problem, joint swelling and myalgias.   Skin:  Negative for rash.   Neurological:  Negative for tremors, seizures, speech difficulty, weakness and headaches.   Hematological:  Does not bruise/bleed easily.         Objective:        Physical Exam:   Foot Exam    General  General Appearance: appears stated age and healthy   Orientation: alert and oriented to person, place, and time   Affect: appropriate   Gait: unimpaired       Right Foot/Ankle     Inspection and Palpation  Ecchymosis: none  Tenderness: great toe metatarsophalangeal joint   Swelling: none   Arch: normal  Hallux valgus: yes  Skin Exam: skin intact;   Neurovascular  Dorsalis pedis: 2+  Posterior tibial: 2+  Capillary Refill: 2+  Varicose veins: not present  Saphenous nerve sensation: normal  Tibial nerve sensation: normal  Superficial peroneal nerve sensation: normal  Deep peroneal nerve sensation: normal  Sural nerve sensation: normal    Edema  Type of edema: non-pitting    Muscle Strength  Ankle dorsiflexion: 5  Ankle plantar flexion: 5  Ankle inversion: 5  Ankle eversion: 5  Great toe extension: 5  Great toe flexion: 5    Range of Motion    Normal right ankle ROM    Tests  Anterior drawer: negative   Talar tilt: negative   PT Tinel's sign: negative    Paresthesia: negative  Comments  Moderate bunion deformity.  Great toe laterally deviated abutting the 2nd toe.  Moderate 1st ray hypermobility is present with hallux limitus.        Physical Exam  Cardiovascular:      Pulses:           Dorsalis pedis pulses are 2+ on the right side.         Posterior tibial pulses are 2+ on the right side.   Musculoskeletal:      Right foot: Bunion present.               Right Ankle/Foot Exam     Tenderness   The patient is tender to palpation of the great toe metatarsophalangeal joint.    Range of Motion   The patient has normal right ankle ROM.    Comments:  Moderate bunion deformity.  Great toe laterally deviated abutting the 2nd toe.  Moderate 1st ray hypermobility is present with hallux limitus.        Muscle Strength   Right Lower Extremity   Ankle Dorsiflexion:  5   Plantar flexion:  5/5    Vascular Exam     Right Pulses  Dorsalis Pedis:      2+  Posterior Tibial:      2+           Imaging:  Three views of the right foot with moderate bunion increased 1st and 2nd intermetatarsal angle of 15°.  Joint spaces well maintained.  No fractures or dislocations.           Assessment:       1. Bunion    2. Right foot pain      Plan:   Bunion    Right foot pain  -     X-Ray Foot Complete Right      Follow up if symptoms worsen or fail to improve.    Procedures        I provided patient education regarding: Bunion deformity and causes. I discussed conservative treatment with shoe modification, pads, treating symptoms with OTC NSAIDs, pads between toes, inserts and pads over the bunion. I explained that conservative treatment is non-curative but may help with pain and slow down the progression of the deformity.  Made recommendations for shoes with toe box.       Counseling:     I provided patient education verbally regarding:   Patient diagnosis, treatment options, as well as alternatives, risks, and benefits.     This note was created using Dragon voice recognition software that occasionally misinterpreted phrases or words.                    [1]   Current Outpatient Medications   Medication Sig Dispense Refill    acetaminophen (TYLENOL) 500 MG tablet Take every 4 hours around the clock for the first 1-2 days then every 4 hours as needed.      oxyCODONE (ROXICODONE) 5 MG  immediate release tablet Take 1 tablet (5 mg total) by mouth every 4 (four) hours as needed for Pain (breakthrough pain only). 30 tablet 0     No current facility-administered medications for this visit.

## 2025-02-26 NOTE — PATIENT INSTRUCTIONS
Bunion    You have a bunion. This is a bony bump at the base of your big toe, along the inside edge of your foot. As the bump gets bigger, it can become red, swollen, and painful with shoe wear.  Bunions may occur if you wear shoes that are too tight and pinch your toes together. High heels may make this worse. In some cases a bunion is due to poor alignment of the foot and ankle. This puts extra weight on the instep of each foot.  Once a bunion forms, it changes the way weight is spread all across your foot. This causes the bunion to get worse over time. The big toe will bend more and more toward the other toes.  A minor bunion can be treated by:  Wearing properly fitting shoes  Using bunion pads  Wearing shoe inserts, called orthotics, to better align the foot and ankle  Physical therapy with ultrasound or whirlpool baths can ease pain, redness, and swelling. Severe cases may require surgery. If you dont treat what is causing the bunion, it may get larger and more painful.  Home care  Limit high heels. These shoes force your foot forward, crowding the toes together.  Switch to comfortable shoes with a wide toe area. Or have your existing shoes stretched by a shoe repair shop.  Avoid shoes that are tight, narrow, or pointed.  If you are flat-footed, using arch supports may help prevent further deformity. The best shoe inserts are the ones custom made by a foot specialist, called a podiatrist, or other healthcare provider.  Put a bunion pad over the bunion to ease pressure of your shoe against the bunion. You can buy these pads at most pharmacies without a prescription  To reduce pain and swelling, apply an ice pack over the injured area for 15 to 20 minutes. Do this every 1 to 2 hours the first day. Keep using ice 3 to 4 times a day until the pain and swelling goes away.  To make an ice pack, put ice cubes in a sealed zip-lock plastic bag. Wrap the bag in a clean, thin towel or cloth. Never put ice or an ice pack  directly on the skin.  You may use over-the-counter pain medicine to control pain, unless another medicine was prescribed. Talk with your provider before using these medicines if you have chronic liver or kidney disease, or ever had a stomach ulcer or GI (gastrointestinal) bleeding.  Follow-up care  Follow up with a podiatrist or foot doctor, or as advised.  If X-rays were taken, you will be notified of any new findings that may affect your care.  When to seek medical care  Contact your healthcare provider if any of the following occur:  Increasing pain or redness around the base of the big toe  Painful ingrown toenail, with redness and swelling or pus around the nail  Date Last Reviewed: 11/21/2015  © 2927-3943 [employer], [employer]. [address] [place] [address], [place] [address]. All rights reserved. This information is not intended as a substitute for professional medical care. Always follow your healthcare professional's instructions.

## 2025-03-06 ENCOUNTER — OFFICE VISIT (OUTPATIENT)
Dept: OTOLARYNGOLOGY | Facility: CLINIC | Age: 30
End: 2025-03-06
Payer: COMMERCIAL

## 2025-03-06 VITALS — HEIGHT: 63 IN | BODY MASS INDEX: 21.36 KG/M2 | RESPIRATION RATE: 16 BRPM | WEIGHT: 120.56 LBS

## 2025-03-06 DIAGNOSIS — Z90.89 STATUS POST TONSILLECTOMY: Primary | ICD-10-CM

## 2025-03-06 PROCEDURE — 1159F MED LIST DOCD IN RCRD: CPT | Mod: CPTII,S$GLB,, | Performed by: OTOLARYNGOLOGY

## 2025-03-06 PROCEDURE — 99999 PR PBB SHADOW E&M-EST. PATIENT-LVL III: CPT | Mod: PBBFAC,,, | Performed by: OTOLARYNGOLOGY

## 2025-03-06 PROCEDURE — 1160F RVW MEDS BY RX/DR IN RCRD: CPT | Mod: CPTII,S$GLB,, | Performed by: OTOLARYNGOLOGY

## 2025-03-06 PROCEDURE — 99024 POSTOP FOLLOW-UP VISIT: CPT | Mod: S$GLB,,, | Performed by: OTOLARYNGOLOGY

## 2025-03-06 NOTE — PROGRESS NOTES
"    [facility]  POSTOPERATIVE VISIT NOTE    Subjective:      Patient: Kade Moreno Patient PCP: Ector Kapadia, APRN,FNP-C         :  1995     Sex:  female      MRN:  [id number]          Date of Visit: 2025      Chief Complaint/Narrative: Post-op Evaluation (4wk po tonsils 2025 )      Subjective:  Status post tonsillectomy bilaterally for recurrent peritonsillar abscess.  Concerned about a small hole in the anterior tonsillar pillar on the right side.  No symptoms just concerned about the appearance.  No speech changes, chronic pain or change in taste.  Feels well.  No postoperative bleeding.      All medications, allergies, and past history have been reviewed.    Objective:      Vitals:      2025     9:37 AM 2025     9:59 AM 3/6/2025     1:49 PM   Vitals - 1 value per visit   Resp   16   Weight (lb)  118.61 120.59   Weight (kg)  53.8 54.7   Height   5' 3" (1.6 m)   BMI (Calculated)   21.4   Pain Score Two Zero Zero       Body surface area is 1.56 meters squared.    Physical Exam:    Tiny pinhole in the mid anterior pillar on the right side.  Tiny tonsillar tag on the right side.  Left side normal.  No concerning findings.  Normal voice.      Procedure(s):        Assessment:      Problem List Items Addressed This Visit    None  Visit Diagnoses         Status post tonsillectomy    -  Primary                 Plan/recommendations:      Doing well post tonsillectomy.  Follow up as needed    "

## 2025-03-24 ENCOUNTER — LAB VISIT (OUTPATIENT)
Dept: LAB | Facility: HOSPITAL | Age: 30
End: 2025-03-24
Attending: NURSE PRACTITIONER
Payer: COMMERCIAL

## 2025-03-24 ENCOUNTER — OFFICE VISIT (OUTPATIENT)
Dept: FAMILY MEDICINE | Facility: CLINIC | Age: 30
End: 2025-03-24
Payer: COMMERCIAL

## 2025-03-24 VITALS
DIASTOLIC BLOOD PRESSURE: 68 MMHG | SYSTOLIC BLOOD PRESSURE: 116 MMHG | HEART RATE: 61 BPM | BODY MASS INDEX: 21.17 KG/M2 | OXYGEN SATURATION: 99 % | WEIGHT: 119.5 LBS | HEIGHT: 63 IN

## 2025-03-24 DIAGNOSIS — Z00.00 ROUTINE HEALTH MAINTENANCE: ICD-10-CM

## 2025-03-24 DIAGNOSIS — Z00.00 ROUTINE HEALTH MAINTENANCE: Primary | ICD-10-CM

## 2025-03-24 LAB
ABSOLUTE EOSINOPHIL (OHS): 0.11 K/UL
ABSOLUTE MONOCYTE (OHS): 0.4 K/UL (ref 0.3–1)
ABSOLUTE NEUTROPHIL COUNT (OHS): 1.19 K/UL (ref 1.8–7.7)
ALBUMIN SERPL BCP-MCNC: 3.9 G/DL (ref 3.5–5.2)
ALP SERPL-CCNC: 39 UNIT/L (ref 40–150)
ALT SERPL W/O P-5'-P-CCNC: 15 UNIT/L (ref 10–44)
ANION GAP (OHS): 10 MMOL/L (ref 8–16)
AST SERPL-CCNC: 17 UNIT/L (ref 11–45)
BASOPHILS # BLD AUTO: 0.03 K/UL
BASOPHILS NFR BLD AUTO: 0.7 %
BILIRUB SERPL-MCNC: 0.3 MG/DL (ref 0.1–1)
BUN SERPL-MCNC: 13 MG/DL (ref 6–20)
CALCIUM SERPL-MCNC: 9.2 MG/DL (ref 8.7–10.5)
CHLORIDE SERPL-SCNC: 106 MMOL/L (ref 95–110)
CHOLEST SERPL-MCNC: 199 MG/DL (ref 120–199)
CHOLEST/HDLC SERPL: 3 {RATIO} (ref 2–5)
CO2 SERPL-SCNC: 22 MMOL/L (ref 23–29)
CREAT SERPL-MCNC: 0.6 MG/DL (ref 0.5–1.4)
ERYTHROCYTE [DISTWIDTH] IN BLOOD BY AUTOMATED COUNT: 14.6 % (ref 11.5–14.5)
GFR SERPLBLD CREATININE-BSD FMLA CKD-EPI: >60 ML/MIN/1.73/M2
GLUCOSE SERPL-MCNC: 89 MG/DL (ref 70–110)
HCT VFR BLD AUTO: 37.2 % (ref 37–48.5)
HDLC SERPL-MCNC: 67 MG/DL (ref 40–75)
HDLC SERPL: 33.7 % (ref 20–50)
HGB BLD-MCNC: 11.5 GM/DL (ref 12–16)
IMM GRANULOCYTES # BLD AUTO: 0.01 K/UL (ref 0–0.04)
IMM GRANULOCYTES NFR BLD AUTO: 0.2 % (ref 0–0.5)
LDLC SERPL CALC-MCNC: 121.8 MG/DL (ref 63–159)
LYMPHOCYTES # BLD AUTO: 2.6 K/UL (ref 1–4.8)
MCH RBC QN AUTO: 27.7 PG (ref 27–50)
MCHC RBC AUTO-ENTMCNC: 30.9 G/DL (ref 32–36)
MCV RBC AUTO: 90 FL (ref 82–98)
NONHDLC SERPL-MCNC: 132 MG/DL
NUCLEATED RBC (/100WBC) (OHS): 0 /100 WBC
PLATELET # BLD AUTO: 227 K/UL (ref 150–450)
PMV BLD AUTO: 11.5 FL (ref 9.2–12.9)
POTASSIUM SERPL-SCNC: 4 MMOL/L (ref 3.5–5.1)
PROT SERPL-MCNC: 6.9 GM/DL (ref 6–8.4)
RBC # BLD AUTO: 4.15 M/UL (ref 4–5.4)
RELATIVE EOSINOPHIL (OHS): 2.5 %
RELATIVE LYMPHOCYTE (OHS): 59.9 % (ref 18–48)
RELATIVE MONOCYTE (OHS): 9.2 % (ref 4–15)
RELATIVE NEUTROPHIL (OHS): 27.5 % (ref 38–73)
SODIUM SERPL-SCNC: 138 MMOL/L (ref 136–145)
TRIGL SERPL-MCNC: 51 MG/DL (ref 30–150)
TSH SERPL-ACNC: 1.27 UIU/ML (ref 0.4–4)
WBC # BLD AUTO: 4.34 K/UL (ref 3.9–12.7)

## 2025-03-24 PROCEDURE — 3078F DIAST BP <80 MM HG: CPT | Mod: CPTII,S$GLB,, | Performed by: NURSE PRACTITIONER

## 2025-03-24 PROCEDURE — 99395 PREV VISIT EST AGE 18-39: CPT | Mod: S$GLB,,, | Performed by: NURSE PRACTITIONER

## 2025-03-24 PROCEDURE — 99999 PR PBB SHADOW E&M-EST. PATIENT-LVL III: CPT | Mod: PBBFAC,,, | Performed by: NURSE PRACTITIONER

## 2025-03-24 PROCEDURE — 1160F RVW MEDS BY RX/DR IN RCRD: CPT | Mod: CPTII,S$GLB,, | Performed by: NURSE PRACTITIONER

## 2025-03-24 PROCEDURE — 36415 COLL VENOUS BLD VENIPUNCTURE: CPT | Mod: PN

## 2025-03-24 PROCEDURE — 84443 ASSAY THYROID STIM HORMONE: CPT

## 2025-03-24 PROCEDURE — 80053 COMPREHEN METABOLIC PANEL: CPT

## 2025-03-24 PROCEDURE — 80061 LIPID PANEL: CPT

## 2025-03-24 PROCEDURE — 3008F BODY MASS INDEX DOCD: CPT | Mod: CPTII,S$GLB,, | Performed by: NURSE PRACTITIONER

## 2025-03-24 PROCEDURE — 3074F SYST BP LT 130 MM HG: CPT | Mod: CPTII,S$GLB,, | Performed by: NURSE PRACTITIONER

## 2025-03-24 PROCEDURE — 1159F MED LIST DOCD IN RCRD: CPT | Mod: CPTII,S$GLB,, | Performed by: NURSE PRACTITIONER

## 2025-03-24 PROCEDURE — 85025 COMPLETE CBC W/AUTO DIFF WBC: CPT

## 2025-03-24 NOTE — PROGRESS NOTES
SUBJECTIVE:      Patient ID: Kade Moreno is a 29 y.o. female.    Chief Complaint: Annual Exam (Annual/Well Exam)    Presents for well exam and lab orders    Discussed outstanding health maintenance        Past Surgical History:   Procedure Laterality Date    neck abscess  2022    no family history of anes problems      No past surgical history      TONSILLECTOMY Bilateral 2/4/2025    Procedure: TONSILLECTOMY;  Surgeon: Ad Cuevas MD;  Location: [facility];  Service: ENT;  Laterality: Bilateral;     Family History   Problem Relation Name Age of Onset    Hypertension Mother      Hyperlipidemia Mother      Breast cancer Maternal Grandmother      Hypertension Maternal Grandfather      Hyperlipidemia Maternal Grandfather      Heart disease Maternal Grandfather        Social History     Socioeconomic History    Marital status:     Number of children: 0   Tobacco Use    Smoking status: Former     Types: Vaping with nicotine    Smokeless tobacco: Never    Tobacco comments:     Recent ex smoker 1/2 ppd   Substance and Sexual Activity    Alcohol use: Yes     Alcohol/week: 5.0 standard drinks of alcohol     Types: 4 Glasses of wine, 1 Shots of liquor per week     Comment: socially    Drug use: Never    Sexual activity: Yes     Partners: Male     Birth control/protection: Condom     Social Drivers of Health     Financial Resource Strain: Low Risk  (3/23/2025)    Overall Financial Resource Strain (CARDIA)     Difficulty of Paying Living Expenses: Not hard at all   Food Insecurity: No Food Insecurity (3/23/2025)    Hunger Vital Sign     Worried About Running Out of Food in the Last Year: Never true     Ran Out of Food in the Last Year: Never true   Transportation Needs: No Transportation Needs (3/23/2025)    PRAPARE - Transportation     Lack of Transportation (Medical): No     Lack of Transportation (Non-Medical): No   Physical Activity: Sufficiently Active (3/23/2025)    Exercise Vital Sign      Days of Exercise per Week: 4 days     Minutes of Exercise per Session: 130 min   Stress: No Stress Concern Present (3/23/2025)    [employer] [place] [employer] - Occupational Stress Questionnaire     Feeling of Stress : Only a little   Housing Stability: Low Risk  (3/23/2025)    Housing Stability Vital Sign     Unable to Pay for Housing in the Last Year: No     Homeless in the Last Year: No     No current outpatient medications on file.     No current facility-administered medications for this visit.     Review of patient's allergies indicates:  No Known Allergies   Past Medical History:   Diagnosis Date    Pharyngitis      Past Surgical History:   Procedure Laterality Date    neck abscess  2022    no family history of anes problems      No past surgical history      TONSILLECTOMY Bilateral 2/4/2025    Procedure: TONSILLECTOMY;  Surgeon: Ad Cuevas MD;  Location: [facility];  Service: ENT;  Laterality: Bilateral;       Review of Systems   Constitutional:  Negative for activity change, appetite change, fatigue and unexpected weight change.   HENT:  Negative for congestion, ear pain, hearing loss, postnasal drip, rhinorrhea, sinus pressure, sinus pain, sneezing, sore throat and trouble swallowing.    Eyes:  Negative for photophobia, pain, discharge and visual disturbance.   Respiratory:  Negative for cough, chest tightness, shortness of breath and wheezing.    Cardiovascular:  Negative for chest pain, palpitations and leg swelling.   Gastrointestinal:  Negative for abdominal distention, abdominal pain, blood in stool, constipation, diarrhea, nausea and vomiting.   Endocrine: Negative for cold intolerance, heat intolerance, polydipsia and polyuria.   Genitourinary:  Negative for difficulty urinating, dysuria, flank pain, frequency, hematuria, menstrual problem, pelvic pain and urgency.   Musculoskeletal:  Negative for arthralgias, back pain, joint swelling, myalgias and neck pain.   Skin:   "Negative for pallor.   Allergic/Immunologic: Negative for environmental allergies and food allergies.   Neurological:  Negative for dizziness, weakness, light-headedness, numbness and headaches.   Hematological:  Does not bruise/bleed easily.   Psychiatric/Behavioral:  Negative for agitation, confusion, decreased concentration, dysphoric mood and sleep disturbance. The patient is not nervous/anxious.       OBJECTIVE:      Vitals:    03/24/25 1335   BP: 116/68   BP Location: Right arm   Patient Position: Sitting   Pulse: 61   SpO2: 99%   Weight: 54.2 kg (119 lb 7.8 oz)   Height: 5' 3" (1.6 m)     Physical Exam  Vitals and nursing note reviewed.   Constitutional:       General: She is not in acute distress.     Appearance: Normal appearance. She is well-developed, well-groomed and normal weight.      Comments: 12# gain since 2/2024 visit   HENT:      Head: Normocephalic and atraumatic.      Right Ear: Hearing normal.      Left Ear: Hearing normal.      Nose: Nose normal. No rhinorrhea.   Eyes:      General: Lids are normal.         Right eye: No discharge.         Left eye: No discharge.      Conjunctiva/sclera: Conjunctivae normal.      Right eye: Right conjunctiva is not injected.      Left eye: Left conjunctiva is not injected.      Pupils: Pupils are equal, round, and reactive to light. Pupils are equal.      Right eye: Pupil is round and reactive.      Left eye: Pupil is round and reactive.   Neck:      Thyroid: No thyromegaly.      Vascular: No JVD.      Trachea: Trachea normal. No tracheal deviation.   Cardiovascular:      Rate and Rhythm: Normal rate and regular rhythm.      Pulses:           Radial pulses are 2+ on the right side and 2+ on the left side.        Posterior tibial pulses are 2+ on the right side and 2+ on the left side.      Heart sounds: Normal heart sounds. No murmur heard.     No friction rub. No gallop.   Pulmonary:      Effort: Pulmonary effort is normal. No respiratory distress.      " Breath sounds: Normal breath sounds. No stridor. No decreased breath sounds, wheezing, rhonchi or rales.   Abdominal:      General: Bowel sounds are normal. There is no distension.      Palpations: Abdomen is soft. Abdomen is not rigid.      Tenderness: There is no abdominal tenderness. There is no guarding.   Musculoskeletal:         General: Normal range of motion.      Cervical back: Normal range of motion and neck supple.   Lymphadenopathy:      Cervical: No cervical adenopathy.   Skin:     General: Skin is warm and dry.      Capillary Refill: Capillary refill takes less than 2 seconds.      Coloration: Skin is not pale.      Findings: No lesion or rash.   Neurological:      Mental Status: She is alert and oriented to person, place, and time.      GCS: GCS eye subscore is 4. GCS verbal subscore is 5. GCS motor subscore is 6.      Cranial Nerves: Cranial nerves 2-12 are intact.      Sensory: Sensation is intact.      Motor: Motor function is intact. No atrophy.      Coordination: Coordination is intact. Coordination normal.      Gait: Gait is intact. Gait normal.   Psychiatric:         Attention and Perception: Attention and perception normal. She is attentive.         Mood and Affect: Mood and affect normal.         Speech: Speech normal.         Behavior: Behavior normal.         Thought Content: Thought content normal.         Cognition and Memory: Cognition and memory normal.         Judgment: Judgment normal.        Assessment:       1. Routine health maintenance        Plan:       Routine health maintenance  -     CBC Auto Differential; Future; Expected date: 03/31/2025  -     Comprehensive Metabolic Panel; Future; Expected date: 03/24/2025  -     Lipid Panel; Future; Expected date: 03/24/2025  -     TSH; Future; Expected date: 03/24/2025  - will call with results of labs  - Limit: red meat, butter, fried foods, cheese, and other foods that have a lot of saturated fat. Consume: lean meats, fish, fruits,  vegetables, whole grains, beans, lentils, and nuts. Adequate daily hydration.  - Patient counseled on age appropriate medical preventive services, age appropriate cancer screenings, nutrition, healthy diet, consistent exercise regimen and maintaining an active lifestyle.  - Instructed on guidelines recommending 150 minutes per week of brisk exercise and healthy lifestyle. Recommended well screenings (including immunizations) reviewed with patient. Discussed outstanding health maintenance and rationale for completion.          Follow up in about 1 year (around 3/24/2026) for well exam.      3/24/2025 CHOLO Richardson, FNP-C

## 2025-03-26 ENCOUNTER — PATIENT MESSAGE (OUTPATIENT)
Dept: FAMILY MEDICINE | Facility: CLINIC | Age: 30
End: 2025-03-26
Payer: COMMERCIAL

## 2025-03-26 DIAGNOSIS — R79.89 ABNORMAL CBC: Primary | ICD-10-CM

## 2025-07-31 ENCOUNTER — PATIENT MESSAGE (OUTPATIENT)
Dept: FAMILY MEDICINE | Facility: CLINIC | Age: 30
End: 2025-07-31
Payer: COMMERCIAL

## 2025-08-01 ENCOUNTER — OFFICE VISIT (OUTPATIENT)
Dept: FAMILY MEDICINE | Facility: CLINIC | Age: 30
End: 2025-08-01
Payer: COMMERCIAL

## 2025-08-01 VITALS
HEIGHT: 63 IN | HEART RATE: 69 BPM | TEMPERATURE: 99 F | SYSTOLIC BLOOD PRESSURE: 122 MMHG | DIASTOLIC BLOOD PRESSURE: 62 MMHG | BODY MASS INDEX: 22.81 KG/M2 | OXYGEN SATURATION: 99 % | WEIGHT: 128.75 LBS

## 2025-08-01 DIAGNOSIS — L50.9 HIVES: Primary | ICD-10-CM

## 2025-08-01 PROCEDURE — 99999 PR PBB SHADOW E&M-EST. PATIENT-LVL III: CPT | Mod: PBBFAC,,, | Performed by: NURSE PRACTITIONER

## 2025-08-01 RX ORDER — DEXAMETHASONE SODIUM PHOSPHATE 4 MG/ML
4 INJECTION, SOLUTION INTRA-ARTICULAR; INTRALESIONAL; INTRAMUSCULAR; INTRAVENOUS; SOFT TISSUE
Status: COMPLETED | OUTPATIENT
Start: 2025-08-01 | End: 2025-08-01

## 2025-08-01 RX ORDER — CETIRIZINE HYDROCHLORIDE 10 MG/1
10 TABLET ORAL DAILY
COMMUNITY

## 2025-08-01 RX ORDER — EPINEPHRINE 0.3 MG/.3ML
1 INJECTION SUBCUTANEOUS ONCE
Qty: 0.3 ML | Refills: 0 | Status: SHIPPED | OUTPATIENT
Start: 2025-08-01 | End: 2025-08-01

## 2025-08-01 RX ADMIN — DEXAMETHASONE SODIUM PHOSPHATE 4 MG: 4 INJECTION, SOLUTION INTRA-ARTICULAR; INTRALESIONAL; INTRAMUSCULAR; INTRAVENOUS; SOFT TISSUE at 11:08

## 2025-08-01 NOTE — PROGRESS NOTES
This dictation has been generated using Modal Fluency Dictation some phonetic errors may occur. Please contact author for clarification if needed.     1. Hives  -     Ambulatory referral/consult to Allergy; Future; Expected date: 08/08/2025  -     EPINEPHrine (EPIPEN) 0.3 mg/0.3 mL AtIn; Inject 0.3 mLs (0.3 mg total) into the muscle once. for 1 dose  Dispense: 0.3 mL; Refill: 0    Other orders  -     dexAMETHasone injection 4 mg       Assessment & Plan    IMPRESSION:  - Chronic hives worsening in duration and severity over past 3-4 weeks.  - Symptoms primarily occurring during sleep, affecting lower body.  - No clear allergen identified despite previous evaluation.  - Current antihistamine regimen with Zyrtec (4 pills daily) and Benadryl (2 pills daily) ineffective.  - Considered additional histamine receptor blockade with H2 antagonist and started Pepcid.  - Steroid treatment indicated due to extensive body coverage and symptom severity.  - Concern for potential progression to angioedema, given history of lip swelling.  - Ruled out infection based on exam and presentation.  -SERB of steroid therapy discussed. Long term effects on bones and adrenals could cause life time problems.    IDIOPATHIC URTICARIA (HIVES):  - Monitored the patient who has been experiencing persistent hives for the last few weeks, primarily from the waist down, though hives were observed on the abdomen and upper chest during exam.  - Ms. Moreno reports hives were better this morning but experienced lip swelling yesterday.  - Noted that antihistamines like Zyrtec are not providing relief, and the condition is affecting sleep quality.  - Confirmed throat exam is clear.  - Ms. Moreno uses hypoallergenic laundry soap without improvement in symptoms.  - Assessed that the allergic reaction is worsening in duration and surface area, indicating progression.  - Prescribed Pepcid to block histamine at the receptor site and administered steroid  injection to quiet the allergic response.  - Explained mechanism of histamine release and antihistamine action.  - Informed about potential side effects of steroid treatment, including fluid retention and weight gain.  - Prescribed EpiPen for emergency use and educated on signs of anaphylaxis and proper EpiPen usage.    FEVER EVALUATION:  - Noted the patient reports having chills in the last few days.  - Evaluation showed no fever or infection; throat exam is clear.    PERSONAL HISTORY OF ANAPHYLAXIS:  - none  - epipen prescribed.     FAMILY HISTORY OF ALLERGIES:  - Noted that mother had allergies, presenting as a rash on her hand.    ALLERGY REFERRAL AND FOLLOW-UP:  - Referred to allergy group/specialist for further evaluation and management of hives.         Hives recommended antihistamine, histamine blocker, sparingly use steroid, and follow-up with allergist.  She has never had anaphylaxis she did have a picture with lip swelling but denies any tongue swelling.  EpiPen giving the increase in hives concerned about possibility of an anaphylactic reaction.  In excessive of 30 minutes total time spent for evaluation and management services. Time included elements of the following: time spent preparing to see patient, obtaining and reviewing separately obtained history, exam, evaluation, counseling and education of patient/family member or care giver, documenting in the HMR, independently interpreting results and communication of results, coordination of care ordering medications, tests, or procedures, referral and communication to other health care professionals.    I will review all results and address accordingly.   Follow up if symptoms worsen or fail to improve.    ________________________________________________________________  ________________________________________________________________      Chief Complaint   Patient presents with    Urticaria     Hives or welts on skin, nonstop for the past 3 wks      History of present illness  History of Present Illness    CHIEF COMPLAINT:  Ms. Moreno presents today for chronic hives.    HISTORY OF PRESENT ILLNESS:  She reports persistent hives for 3-4 weeks, primarily affecting areas from waist down. The hives occur exclusively during sleep, causing significant sleep disturbance and itching that prevents restful sleep. She describes this current episode as different from previous occurrences and notes this is the first time hives have been non-stop for several weeks. She has developed recent onset of fever and chills over the past few days.    HIVES HISTORY:  She has a 10-year history of recurrent hives, with the first episode severe enough to require emergency room treatment. She reports intermittent episodes over the years but has never received a formal diagnosis. She has a history of lip swelling in the past and has previously sought emergency care for hives, receiving intermittent shots for symptom management. She was previously evaluated at [facility], where providers indicated that in approximately 90% of cases, the underlying cause of hives cannot be definitively determined.    CURRENT MEDICATIONS:  She is taking Zyrtec 4 pills daily and Benadryl 2 pills daily for presumed allergic reaction. The antihistamines are not providing adequate relief.    FAMILY HISTORY:  Her mother has a history of intermittent non-specific rash on hands that occurs randomly. She denies her mother having hives or other significant allergic conditions.    REVIEW OF SYSTEMS:  She denies joint pain.      ROS:  General: +chills, +sleep disturbances  Musculoskeletal: -joint pain  Skin: +hives, +urticaria/ hives, +itching  Head: +facial swelling         Past Medical History:   Diagnosis Date    Pharyngitis        Past Surgical History:   Procedure Laterality Date    neck abscess  2022    no family history of anes problems      No past surgical history      TONSILLECTOMY Bilateral  2/4/2025    Procedure: TONSILLECTOMY;  Surgeon: Ad Cuevas MD;  [facility]: [facility];  Service: ENT;  Laterality: Bilateral;       Family History   Problem Relation Name Age of Onset    Hypertension Mother      Hyperlipidemia Mother      Breast cancer Maternal Grandmother      Hypertension Maternal Grandfather      Hyperlipidemia Maternal Grandfather      Heart disease Maternal Grandfather         Social History     Socioeconomic History    Marital status:     Number of children: 0   Tobacco Use    Smoking status: Former     Types: Vaping with nicotine    Smokeless tobacco: Never    Tobacco comments:     Recent ex smoker 1/2 ppd   Substance and Sexual Activity    Alcohol use: Yes     Alcohol/week: 5.0 standard drinks of alcohol     Types: 4 Glasses of wine, 1 Shots of liquor per week     Comment: socially    Drug use: Never    Sexual activity: Yes     Partners: Male     Birth control/protection: Condom     Social Drivers of Health     Financial Resource Strain: Low Risk  (3/23/2025)    Overall Financial Resource Strain (CARDIA)     Difficulty of Paying Living Expenses: Not hard at all   Food Insecurity: No Food Insecurity (3/23/2025)    Hunger Vital Sign     Worried About Running Out of Food in the Last Year: Never true     Ran Out of Food in the Last Year: Never true   Transportation Needs: No Transportation Needs (3/23/2025)    PRAPARE - Transportation     Lack of Transportation (Medical): No     Lack of Transportation (Non-Medical): No   Physical Activity: Sufficiently Active (3/23/2025)    Exercise Vital Sign     Days of Exercise per Week: 4 days     Minutes of Exercise per Session: 130 min   Stress: No Stress Concern Present (3/23/2025)    [employer] [place] [employer] - Occupational Stress Questionnaire     Feeling of Stress : Only a little   Housing Stability: Low Risk  (3/23/2025)    Housing Stability Vital Sign     Unable to Pay for Housing in the Last Year: No     Homeless in  "the Last Year: No       Current Medications[1]    Review of patient's allergies indicates:  No Known Allergies    Physical examination  Vitals Reviewed  /62 (BP Location: Right arm, Patient Position: Sitting)   Pulse 69   Temp 98.8 °F (37.1 °C) (Oral)   Ht 5' 3" (1.6 m)   Wt 58.4 kg (128 lb 12 oz)   SpO2 99%   BMI 22.81 kg/m²  Body mass index is 22.81 kg/m².     BP Readings from Last 3 Encounters:   08/01/25 122/62   03/24/25 116/68   02/04/25 (!) 111/56       Wt Readings from Last 3 Encounters:   08/01/25 58.4 kg (128 lb 12 oz)   03/24/25 54.2 kg (119 lb 7.8 oz)   03/06/25 54.7 kg (120 lb 9.5 oz)     Physical Exam    HENT: All normal.       She does have a high on the right lateral thigh.  No hives on the abdomen or back.  No facial swelling or rash.  No hives.    She does show me images on her phone of swelling of the left lip.  On another day she had swelling of the right eye lid.  She shows me a picture of marked hives on the right side of the abdomen.  No other images were shared.    This note was generated with the assistance of ambient listening technology. Verbal consent was obtained by the patient and accompanying visitor(s) for the recording of patient appointment to facilitate this note. I attest to having reviewed and edited the generated note for accuracy, though some syntax or spelling errors may persist. Please contact the author of this note for any clarification.      Call or return to clinic prn if these symptoms worsen or fail to improve as anticipated.           [1]   Current Outpatient Medications   Medication Sig Dispense Refill    cetirizine (ZYRTEC) 10 MG tablet Take 10 mg by mouth once daily. Is taking 40mg a day (4 pills)      diphenhydrAMINE (BENADRYL ALLERGY) 50 MG tablet Take 50 mg by mouth nightly as needed for Itching.      EPINEPHrine (EPIPEN) 0.3 mg/0.3 mL AtIn Inject 0.3 mLs (0.3 mg total) into the muscle once. for 1 dose 0.3 mL 0     No current facility-administered " medications for this visit.

## (undated) DEVICE — TUBE CONNECTING 3/16INX6FT

## (undated) DEVICE — YANKAUER OPEN TIP W/O VENT

## (undated) DEVICE — SUCTION COAGULATOR 12FR 6IN

## (undated) DEVICE — TOWEL OR DISP STRL BLUE 4/PK

## (undated) DEVICE — ELECTRODE MEGADYNE RETURN DUAL

## (undated) DEVICE — DRAPE MEDIUM SHEET 40X70IN

## (undated) DEVICE — KIT ANTIFOG W/SPONG & FLUID

## (undated) DEVICE — ELECTRODE CAUTER TIP 2.75IN

## (undated) DEVICE — COVER PROXIMA MAYO STAND

## (undated) DEVICE — SYR BULB EAR/ULCER STER 3OZ

## (undated) DEVICE — GLOVE SENSICARE PI ALOE 8

## (undated) DEVICE — SPONGE GAUZE 16PLY 4X4